# Patient Record
Sex: FEMALE | Race: WHITE | NOT HISPANIC OR LATINO | URBAN - METROPOLITAN AREA
[De-identification: names, ages, dates, MRNs, and addresses within clinical notes are randomized per-mention and may not be internally consistent; named-entity substitution may affect disease eponyms.]

---

## 2017-04-24 ENCOUNTER — EMERGENCY (EMERGENCY)
Facility: HOSPITAL | Age: 32
LOS: 0 days | Discharge: HOME | End: 2017-04-24
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
## 2017-06-27 DIAGNOSIS — R11.0 NAUSEA: ICD-10-CM
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
## 2017-06-27 DIAGNOSIS — J45.909 UNSPECIFIED ASTHMA, UNCOMPLICATED: ICD-10-CM

## 2017-06-27 DIAGNOSIS — R55 SYNCOPE AND COLLAPSE: ICD-10-CM
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
## 2019-01-16 PROBLEM — Z00.00 ENCOUNTER FOR PREVENTIVE HEALTH EXAMINATION: Status: ACTIVE | Noted: 2019-01-16

## 2019-02-01 ENCOUNTER — APPOINTMENT (OUTPATIENT)
Dept: OTOLARYNGOLOGY | Facility: CLINIC | Age: 34
End: 2019-02-01
Payer: COMMERCIAL

## 2019-02-01 VITALS
DIASTOLIC BLOOD PRESSURE: 84 MMHG | HEIGHT: 59 IN | SYSTOLIC BLOOD PRESSURE: 112 MMHG | WEIGHT: 97 LBS | BODY MASS INDEX: 19.56 KG/M2

## 2019-02-01 DIAGNOSIS — Z80.6 FAMILY HISTORY OF LEUKEMIA: ICD-10-CM

## 2019-02-01 DIAGNOSIS — Z78.9 OTHER SPECIFIED HEALTH STATUS: ICD-10-CM

## 2019-02-01 DIAGNOSIS — Z80.3 FAMILY HISTORY OF MALIGNANT NEOPLASM OF BREAST: ICD-10-CM

## 2019-02-01 DIAGNOSIS — J45.909 UNSPECIFIED ASTHMA, UNCOMPLICATED: ICD-10-CM

## 2019-02-01 PROCEDURE — 99204 OFFICE O/P NEW MOD 45 MIN: CPT | Mod: 25

## 2019-02-01 PROCEDURE — 31231 NASAL ENDOSCOPY DX: CPT

## 2019-02-01 NOTE — HISTORY OF PRESENT ILLNESS
[de-identified] : Patient here today c/o frequent sinus infections. Persistent pressure in her head leading to dizziness. Patient c/o sore throat and drip. Patient has history of rhinoplasty and sinus surgery 4 years ago. Patient uses Sudafed and Mucinex with no improvement. Patient feels frontal and maxillary pressure and headaches. She also gets double vision when this happens. \par She had amoxicillin recently for an infection.\par No anosmia. breathes well. \par She mentions 8 sinus infections in the past 12 months.\par

## 2019-04-17 ENCOUNTER — APPOINTMENT (OUTPATIENT)
Dept: OTOLARYNGOLOGY | Facility: CLINIC | Age: 34
End: 2019-04-17
Payer: COMMERCIAL

## 2019-04-17 DIAGNOSIS — J01.91 ACUTE RECURRENT SINUSITIS, UNSPECIFIED: ICD-10-CM

## 2019-04-17 DIAGNOSIS — R09.81 NASAL CONGESTION: ICD-10-CM

## 2019-04-17 PROCEDURE — 99214 OFFICE O/P EST MOD 30 MIN: CPT

## 2019-04-17 NOTE — HISTORY OF PRESENT ILLNESS
[de-identified] : Patient returns to the office to follow up on recurrent acute sinusitis. Pt completed a CT of the sinuses; Images were reviewed today in details.

## 2019-07-17 ENCOUNTER — OTHER (OUTPATIENT)
Age: 34
End: 2019-07-17

## 2019-07-17 RX ORDER — BUDESONIDE 0.25 MG/2ML
0.25 INHALANT ORAL
Qty: 2 | Refills: 0 | Status: ACTIVE | COMMUNITY
Start: 2019-04-17 | End: 1900-01-01

## 2021-03-17 ENCOUNTER — EMERGENCY (EMERGENCY)
Facility: HOSPITAL | Age: 36
LOS: 0 days | Discharge: HOME | End: 2021-03-18
Attending: EMERGENCY MEDICINE | Admitting: EMERGENCY MEDICINE
Payer: COMMERCIAL

## 2021-03-17 VITALS
DIASTOLIC BLOOD PRESSURE: 62 MMHG | OXYGEN SATURATION: 97 % | HEART RATE: 70 BPM | RESPIRATION RATE: 18 BRPM | SYSTOLIC BLOOD PRESSURE: 112 MMHG | TEMPERATURE: 98 F

## 2021-03-17 DIAGNOSIS — O21.9 VOMITING OF PREGNANCY, UNSPECIFIED: ICD-10-CM

## 2021-03-17 DIAGNOSIS — Z3A.08 8 WEEKS GESTATION OF PREGNANCY: ICD-10-CM

## 2021-03-17 DIAGNOSIS — O23.41 UNSPECIFIED INFECTION OF URINARY TRACT IN PREGNANCY, FIRST TRIMESTER: ICD-10-CM

## 2021-03-17 DIAGNOSIS — R11.10 VOMITING, UNSPECIFIED: ICD-10-CM

## 2021-03-17 LAB
ALBUMIN SERPL ELPH-MCNC: 4.5 G/DL — SIGNIFICANT CHANGE UP (ref 3.5–5.2)
ALP SERPL-CCNC: 48 U/L — SIGNIFICANT CHANGE UP (ref 30–115)
ALT FLD-CCNC: 26 U/L — SIGNIFICANT CHANGE UP (ref 0–41)
ANION GAP SERPL CALC-SCNC: 10 MMOL/L — SIGNIFICANT CHANGE UP (ref 7–14)
APPEARANCE UR: CLEAR — SIGNIFICANT CHANGE UP
AST SERPL-CCNC: 18 U/L — SIGNIFICANT CHANGE UP (ref 0–41)
BACTERIA # UR AUTO: ABNORMAL
BASOPHILS # BLD AUTO: 0.04 K/UL — SIGNIFICANT CHANGE UP (ref 0–0.2)
BASOPHILS NFR BLD AUTO: 0.4 % — SIGNIFICANT CHANGE UP (ref 0–1)
BILIRUB DIRECT SERPL-MCNC: <0.2 MG/DL — SIGNIFICANT CHANGE UP (ref 0–0.2)
BILIRUB INDIRECT FLD-MCNC: >0.1 MG/DL — LOW (ref 0.2–1.2)
BILIRUB SERPL-MCNC: 0.3 MG/DL — SIGNIFICANT CHANGE UP (ref 0.2–1.2)
BILIRUB UR-MCNC: NEGATIVE — SIGNIFICANT CHANGE UP
BLD GP AB SCN SERPL QL: SIGNIFICANT CHANGE UP
BUN SERPL-MCNC: 13 MG/DL — SIGNIFICANT CHANGE UP (ref 10–20)
CALCIUM SERPL-MCNC: 9.7 MG/DL — SIGNIFICANT CHANGE UP (ref 8.5–10.1)
CHLORIDE SERPL-SCNC: 97 MMOL/L — LOW (ref 98–110)
CO2 SERPL-SCNC: 24 MMOL/L — SIGNIFICANT CHANGE UP (ref 17–32)
COLOR SPEC: YELLOW — SIGNIFICANT CHANGE UP
CREAT SERPL-MCNC: 0.6 MG/DL — LOW (ref 0.7–1.5)
DIFF PNL FLD: ABNORMAL
EOSINOPHIL # BLD AUTO: 0.03 K/UL — SIGNIFICANT CHANGE UP (ref 0–0.7)
EOSINOPHIL NFR BLD AUTO: 0.3 % — SIGNIFICANT CHANGE UP (ref 0–8)
EPI CELLS # UR: 5 /HPF — SIGNIFICANT CHANGE UP (ref 0–5)
GLUCOSE SERPL-MCNC: 76 MG/DL — SIGNIFICANT CHANGE UP (ref 70–99)
GLUCOSE UR QL: NEGATIVE — SIGNIFICANT CHANGE UP
HCT VFR BLD CALC: 40.2 % — SIGNIFICANT CHANGE UP (ref 37–47)
HGB BLD-MCNC: 13.6 G/DL — SIGNIFICANT CHANGE UP (ref 12–16)
HYALINE CASTS # UR AUTO: 14 /LPF — HIGH (ref 0–7)
IMM GRANULOCYTES NFR BLD AUTO: 0.3 % — SIGNIFICANT CHANGE UP (ref 0.1–0.3)
KETONES UR-MCNC: ABNORMAL
LACTATE SERPL-SCNC: 0.9 MMOL/L — SIGNIFICANT CHANGE UP (ref 0.7–2)
LEUKOCYTE ESTERASE UR-ACNC: ABNORMAL
LIDOCAIN IGE QN: 27 U/L — SIGNIFICANT CHANGE UP (ref 7–60)
LYMPHOCYTES # BLD AUTO: 1.67 K/UL — SIGNIFICANT CHANGE UP (ref 1.2–3.4)
LYMPHOCYTES # BLD AUTO: 17.1 % — LOW (ref 20.5–51.1)
MCHC RBC-ENTMCNC: 31.1 PG — HIGH (ref 27–31)
MCHC RBC-ENTMCNC: 33.8 G/DL — SIGNIFICANT CHANGE UP (ref 32–37)
MCV RBC AUTO: 92 FL — SIGNIFICANT CHANGE UP (ref 81–99)
MONOCYTES # BLD AUTO: 0.61 K/UL — HIGH (ref 0.1–0.6)
MONOCYTES NFR BLD AUTO: 6.2 % — SIGNIFICANT CHANGE UP (ref 1.7–9.3)
NEUTROPHILS # BLD AUTO: 7.41 K/UL — HIGH (ref 1.4–6.5)
NEUTROPHILS NFR BLD AUTO: 75.7 % — HIGH (ref 42.2–75.2)
NITRITE UR-MCNC: NEGATIVE — SIGNIFICANT CHANGE UP
NRBC # BLD: 0 /100 WBCS — SIGNIFICANT CHANGE UP (ref 0–0)
PH UR: 6 — SIGNIFICANT CHANGE UP (ref 5–8)
PLATELET # BLD AUTO: 204 K/UL — SIGNIFICANT CHANGE UP (ref 130–400)
POTASSIUM SERPL-MCNC: 3.8 MMOL/L — SIGNIFICANT CHANGE UP (ref 3.5–5)
POTASSIUM SERPL-SCNC: 3.8 MMOL/L — SIGNIFICANT CHANGE UP (ref 3.5–5)
PROT SERPL-MCNC: 7.2 G/DL — SIGNIFICANT CHANGE UP (ref 6–8)
PROT UR-MCNC: ABNORMAL
RBC # BLD: 4.37 M/UL — SIGNIFICANT CHANGE UP (ref 4.2–5.4)
RBC # FLD: 13.1 % — SIGNIFICANT CHANGE UP (ref 11.5–14.5)
RBC CASTS # UR COMP ASSIST: 3 /HPF — SIGNIFICANT CHANGE UP (ref 0–4)
SODIUM SERPL-SCNC: 131 MMOL/L — LOW (ref 135–146)
SP GR SPEC: 1.04 — HIGH (ref 1.01–1.03)
UROBILINOGEN FLD QL: SIGNIFICANT CHANGE UP
WBC # BLD: 9.79 K/UL — SIGNIFICANT CHANGE UP (ref 4.8–10.8)
WBC # FLD AUTO: 9.79 K/UL — SIGNIFICANT CHANGE UP (ref 4.8–10.8)
WBC UR QL: 23 /HPF — HIGH (ref 0–5)

## 2021-03-17 PROCEDURE — 99285 EMERGENCY DEPT VISIT HI MDM: CPT

## 2021-03-17 RX ORDER — SODIUM CHLORIDE 9 MG/ML
2000 INJECTION INTRAMUSCULAR; INTRAVENOUS; SUBCUTANEOUS ONCE
Refills: 0 | Status: COMPLETED | OUTPATIENT
Start: 2021-03-17 | End: 2021-03-17

## 2021-03-17 RX ORDER — METOCLOPRAMIDE HCL 10 MG
10 TABLET ORAL ONCE
Refills: 0 | Status: COMPLETED | OUTPATIENT
Start: 2021-03-17 | End: 2021-03-17

## 2021-03-17 RX ADMIN — Medication 10 MILLIGRAM(S): at 22:29

## 2021-03-17 RX ADMIN — SODIUM CHLORIDE 2000 MILLILITER(S): 9 INJECTION INTRAMUSCULAR; INTRAVENOUS; SUBCUTANEOUS at 21:50

## 2021-03-17 NOTE — ED PROVIDER NOTE - ATTENDING CONTRIBUTION TO CARE
35yr old female  8 weeks pregnant here for eval of vomitting. pt reports mult episodes of vomiting estimates 20X a day for the past few days. pt also endorses mild lower abdominal pain, primarily on left. With this patient feels dehyydrated, headache. when she went to the bathroom she had small tinge of blood when she wiped. on exam pt no distress, gravid abdomen, soft non tender, moving all ext.

## 2021-03-17 NOTE — ED PROVIDER NOTE - CLINICAL SUMMARY MEDICAL DECISION MAKING FREE TEXT BOX
Pt presented with nausea and vomiting. Required labs, meds and imaging. Pt presented with nausea and vomiting, + 8 wks gestation. Required labs, meds and imaging. Was found have have IUP with normal FHR. Pt tolerated juice. Will be d/luis eduardo with outpt f/up.

## 2021-03-17 NOTE — ED PROVIDER NOTE - CARE PROVIDER_API CALL
Bairon Sams)  Obstetrics and Gynecology  [address] [place] [address]  Phone: [phone]  Fax: [phone]  Follow Up Time: 1-3 Days

## 2021-03-17 NOTE — ED PROVIDER NOTE - OBJECTIVE STATEMENT
34 y/o F , 1 miscarriage, currently 8wks pregnant by US presents with multiple episodes of nausea and non-bloody non-bilious vomiting x 4 days-- spoke to her OB Dr. Sams's office who recommended eval in ED. +mild LLQ abdominal pain-- constant cramping, non-radiating. no palliating/provoking factors. +decreased frequency of urination x 2 days.   no vaginal bleeding, but relates that here she wiped prior to providing urine sample and there was brown on the toilet paper-- but no blood in underwear/in toilet.   no cp/sob/covid contacts/fever/rash/cough/congestion.

## 2021-03-17 NOTE — ED PROVIDER NOTE - PROGRESS NOTE DETAILS
ALESHA: pt just brought into ED from triage s/o to Dr. Martinez f/u korin and re-eval Ndubuisi: Sign out received from Dr. Crump  Pt evaluated by me. Presented with nausea and vomiting. Has slight left pelvic cramps. "Wiped brown" after using the bathroom here in the ED. Labs wnl, pending hcg, US with live intrauterine gestation. Will po challenge pt and dispo accordingly. PALEVY: rpt abdominal exam remains nontender. tolerating po. Reviewed all results and necessity for follow up. Counseled on red flags and to return for them.  Patient appears well on discharge.

## 2021-03-17 NOTE — ED PROVIDER NOTE - PHYSICAL EXAMINATION
PHYSICAL EXAM:    GENERAL: Alert, appears stated age, well appearing, non-toxic  SKIN: Warm, pink and dry.  HEAD: NC  EYE: Normal lids/conjunctiva  ENT: Normal hearing, patent oropharynx  NECK: +supple. No meningismus, or JVD  Pulm: Bilateral BS, normal resp effort, no wheezes, stridor, or retractions  CV: RRR, no M/R/G, 2+and = radial pulses  Abd: soft, non-tender, non-distended, no rebound/guarding. no RUQ/RLQ/LLQ/LUQ TTP. no CVA tenderness.   Mskel: no erythema, cyanosis, edema. no calf tenderness  Neuro: AAOx3. normal gait.

## 2021-03-17 NOTE — ED PROVIDER NOTE - PATIENT PORTAL LINK FT
You can access the FollowMyHealth Patient Portal offered by [facility] [place] by registering at the following website: [email or web address]. By joining [employer]’s FollowMyHealth portal, you will also be able to view your health information using other applications (apps) compatible with our system.

## 2021-03-17 NOTE — ED PROVIDER NOTE - CARE PLAN
Principal Discharge DX:	Nausea & vomiting  Secondary Diagnosis:	Pregnancy   Principal Discharge DX:	Nausea & vomiting  Secondary Diagnosis:	Pregnancy  Secondary Diagnosis:	UTI (urinary tract infection)

## 2021-03-17 NOTE — ED PROVIDER NOTE - NS ED ROS FT
Review of Systems    Constitutional: (-) fever   Eyes/ENT: (-) vision changes  Cardiovascular: (-) chest pain, (-) syncope (-) palpitations  Respiratory: (-) cough, (-) shortness of breath  Gastrointestinal: (+) vomiting, (-) diarrhea (-)black/bloody stools (+) abdominal pain  Genitourinary:  (-) dysuria   Musculoskeletal: (-) neck pain, (-) back pain, (-) leg pain/swelling  Integumentary: (-) rash, (-) edema  Neurological: (-) confusion  Hematologic: (-) easy bruising   Psychiatric: (-) hallucinations  Allergic/Immunologic: (-) pruritus

## 2021-03-18 LAB — HCG SERPL-ACNC: HIGH MIU/ML

## 2021-03-18 PROCEDURE — 76856 US EXAM PELVIC COMPLETE: CPT | Mod: 26

## 2021-03-18 RX ORDER — NITROFURANTOIN MACROCRYSTAL 50 MG
1 CAPSULE ORAL
Qty: 10 | Refills: 0
Start: 2021-03-18 | End: 2021-03-22

## 2021-03-18 RX ORDER — NITROFURANTOIN MACROCRYSTAL 50 MG
100 CAPSULE ORAL ONCE
Refills: 0 | Status: COMPLETED | OUTPATIENT
Start: 2021-03-18 | End: 2021-03-18

## 2021-03-18 RX ADMIN — Medication 100 MILLIGRAM(S): at 02:11

## 2021-03-19 LAB
CULTURE RESULTS: SIGNIFICANT CHANGE UP
SPECIMEN SOURCE: SIGNIFICANT CHANGE UP

## 2021-03-23 ENCOUNTER — EMERGENCY (EMERGENCY)
Facility: HOSPITAL | Age: 36
LOS: 0 days | Discharge: HOME | End: 2021-03-23
Attending: EMERGENCY MEDICINE | Admitting: EMERGENCY MEDICINE
Payer: COMMERCIAL

## 2021-03-23 VITALS
HEIGHT: 59 IN | SYSTOLIC BLOOD PRESSURE: 121 MMHG | TEMPERATURE: 99 F | RESPIRATION RATE: 18 BRPM | OXYGEN SATURATION: 97 % | HEART RATE: 75 BPM | WEIGHT: 93.04 LBS | DIASTOLIC BLOOD PRESSURE: 66 MMHG

## 2021-03-23 VITALS
SYSTOLIC BLOOD PRESSURE: 102 MMHG | DIASTOLIC BLOOD PRESSURE: 52 MMHG | TEMPERATURE: 98 F | RESPIRATION RATE: 18 BRPM | HEART RATE: 72 BPM | OXYGEN SATURATION: 100 %

## 2021-03-23 DIAGNOSIS — O21.9 VOMITING OF PREGNANCY, UNSPECIFIED: ICD-10-CM

## 2021-03-23 DIAGNOSIS — Z3A.09 9 WEEKS GESTATION OF PREGNANCY: ICD-10-CM

## 2021-03-23 DIAGNOSIS — J45.909 UNSPECIFIED ASTHMA, UNCOMPLICATED: ICD-10-CM

## 2021-03-23 DIAGNOSIS — R11.10 VOMITING, UNSPECIFIED: ICD-10-CM

## 2021-03-23 LAB
ALBUMIN SERPL ELPH-MCNC: 4.1 G/DL — SIGNIFICANT CHANGE UP (ref 3.5–5.2)
ALP SERPL-CCNC: 43 U/L — SIGNIFICANT CHANGE UP (ref 30–115)
ALT FLD-CCNC: 17 U/L — SIGNIFICANT CHANGE UP (ref 0–41)
ANION GAP SERPL CALC-SCNC: 9 MMOL/L — SIGNIFICANT CHANGE UP (ref 7–14)
APPEARANCE UR: CLEAR — SIGNIFICANT CHANGE UP
AST SERPL-CCNC: 13 U/L — SIGNIFICANT CHANGE UP (ref 0–41)
BASOPHILS # BLD AUTO: 0.02 K/UL — SIGNIFICANT CHANGE UP (ref 0–0.2)
BASOPHILS NFR BLD AUTO: 0.2 % — SIGNIFICANT CHANGE UP (ref 0–1)
BILIRUB SERPL-MCNC: 0.2 MG/DL — SIGNIFICANT CHANGE UP (ref 0.2–1.2)
BILIRUB UR-MCNC: NEGATIVE — SIGNIFICANT CHANGE UP
BUN SERPL-MCNC: 10 MG/DL — SIGNIFICANT CHANGE UP (ref 10–20)
CALCIUM SERPL-MCNC: 9.3 MG/DL — SIGNIFICANT CHANGE UP (ref 8.5–10.1)
CHLORIDE SERPL-SCNC: 100 MMOL/L — SIGNIFICANT CHANGE UP (ref 98–110)
CO2 SERPL-SCNC: 25 MMOL/L — SIGNIFICANT CHANGE UP (ref 17–32)
COLOR SPEC: YELLOW — SIGNIFICANT CHANGE UP
CREAT SERPL-MCNC: 0.5 MG/DL — LOW (ref 0.7–1.5)
DIFF PNL FLD: NEGATIVE — SIGNIFICANT CHANGE UP
EOSINOPHIL # BLD AUTO: 0.03 K/UL — SIGNIFICANT CHANGE UP (ref 0–0.7)
EOSINOPHIL NFR BLD AUTO: 0.3 % — SIGNIFICANT CHANGE UP (ref 0–8)
GLUCOSE SERPL-MCNC: 96 MG/DL — SIGNIFICANT CHANGE UP (ref 70–99)
GLUCOSE UR QL: NEGATIVE MG/DL — SIGNIFICANT CHANGE UP
HCT VFR BLD CALC: 38.9 % — SIGNIFICANT CHANGE UP (ref 37–47)
HGB BLD-MCNC: 13.4 G/DL — SIGNIFICANT CHANGE UP (ref 12–16)
IMM GRANULOCYTES NFR BLD AUTO: 0.3 % — SIGNIFICANT CHANGE UP (ref 0.1–0.3)
KETONES UR-MCNC: NEGATIVE — SIGNIFICANT CHANGE UP
LEUKOCYTE ESTERASE UR-ACNC: NEGATIVE — SIGNIFICANT CHANGE UP
LYMPHOCYTES # BLD AUTO: 1.19 K/UL — LOW (ref 1.2–3.4)
LYMPHOCYTES # BLD AUTO: 13.7 % — LOW (ref 20.5–51.1)
MAGNESIUM SERPL-MCNC: 1.9 MG/DL — SIGNIFICANT CHANGE UP (ref 1.8–2.4)
MCHC RBC-ENTMCNC: 31.9 PG — HIGH (ref 27–31)
MCHC RBC-ENTMCNC: 34.4 G/DL — SIGNIFICANT CHANGE UP (ref 32–37)
MCV RBC AUTO: 92.6 FL — SIGNIFICANT CHANGE UP (ref 81–99)
MONOCYTES # BLD AUTO: 0.51 K/UL — SIGNIFICANT CHANGE UP (ref 0.1–0.6)
MONOCYTES NFR BLD AUTO: 5.9 % — SIGNIFICANT CHANGE UP (ref 1.7–9.3)
NEUTROPHILS # BLD AUTO: 6.92 K/UL — HIGH (ref 1.4–6.5)
NEUTROPHILS NFR BLD AUTO: 79.6 % — HIGH (ref 42.2–75.2)
NITRITE UR-MCNC: NEGATIVE — SIGNIFICANT CHANGE UP
NRBC # BLD: 0 /100 WBCS — SIGNIFICANT CHANGE UP (ref 0–0)
PH UR: 6 — SIGNIFICANT CHANGE UP (ref 5–8)
PLATELET # BLD AUTO: 213 K/UL — SIGNIFICANT CHANGE UP (ref 130–400)
POTASSIUM SERPL-MCNC: 3.9 MMOL/L — SIGNIFICANT CHANGE UP (ref 3.5–5)
POTASSIUM SERPL-SCNC: 3.9 MMOL/L — SIGNIFICANT CHANGE UP (ref 3.5–5)
PROT SERPL-MCNC: 6.4 G/DL — SIGNIFICANT CHANGE UP (ref 6–8)
PROT UR-MCNC: NEGATIVE MG/DL — SIGNIFICANT CHANGE UP
RBC # BLD: 4.2 M/UL — SIGNIFICANT CHANGE UP (ref 4.2–5.4)
RBC # FLD: 13 % — SIGNIFICANT CHANGE UP (ref 11.5–14.5)
SODIUM SERPL-SCNC: 134 MMOL/L — LOW (ref 135–146)
SP GR SPEC: >=1.03 (ref 1.01–1.03)
UROBILINOGEN FLD QL: 0.2 MG/DL — SIGNIFICANT CHANGE UP (ref 0.2–0.2)
WBC # BLD: 8.7 K/UL — SIGNIFICANT CHANGE UP (ref 4.8–10.8)
WBC # FLD AUTO: 8.7 K/UL — SIGNIFICANT CHANGE UP (ref 4.8–10.8)

## 2021-03-23 PROCEDURE — 99284 EMERGENCY DEPT VISIT MOD MDM: CPT

## 2021-03-23 RX ORDER — SODIUM CHLORIDE 9 MG/ML
1000 INJECTION, SOLUTION INTRAVENOUS
Refills: 0 | Status: DISCONTINUED | OUTPATIENT
Start: 2021-03-23 | End: 2021-03-23

## 2021-03-23 RX ORDER — SODIUM CHLORIDE 9 MG/ML
1000 INJECTION, SOLUTION INTRAVENOUS ONCE
Refills: 0 | Status: COMPLETED | OUTPATIENT
Start: 2021-03-23 | End: 2021-03-23

## 2021-03-23 RX ORDER — ONDANSETRON 8 MG/1
1 TABLET, FILM COATED ORAL
Qty: 9 | Refills: 0
Start: 2021-03-23 | End: 2021-03-25

## 2021-03-23 RX ORDER — ONDANSETRON 8 MG/1
4 TABLET, FILM COATED ORAL ONCE
Refills: 0 | Status: COMPLETED | OUTPATIENT
Start: 2021-03-23 | End: 2021-03-23

## 2021-03-23 RX ORDER — METOCLOPRAMIDE HCL 10 MG
10 TABLET ORAL ONCE
Refills: 0 | Status: COMPLETED | OUTPATIENT
Start: 2021-03-23 | End: 2021-03-23

## 2021-03-23 RX ADMIN — SODIUM CHLORIDE 1000 MILLILITER(S): 9 INJECTION, SOLUTION INTRAVENOUS at 14:13

## 2021-03-23 RX ADMIN — ONDANSETRON 4 MILLIGRAM(S): 8 TABLET, FILM COATED ORAL at 14:12

## 2021-03-23 RX ADMIN — SODIUM CHLORIDE 1000 MILLILITER(S): 9 INJECTION, SOLUTION INTRAVENOUS at 14:48

## 2021-03-23 RX ADMIN — Medication 10 MILLIGRAM(S): at 15:36

## 2021-03-23 NOTE — ED ADULT NURSE NOTE - NSIMPLEMENTINTERV_GEN_ALL_ED
Implemented All Fall with Harm Risk Interventions:  [place] to call system. Call bell, personal items and telephone within reach. Instruct patient to call for assistance. Room bathroom lighting operational. Non-slip footwear when patient is off stretcher. Physically safe environment: no spills, clutter or unnecessary equipment. Stretcher in lowest position, wheels locked, appropriate side rails in place. Provide visual cue, wrist band, yellow gown, etc. Monitor gait and stability. Monitor for mental status changes and reorient to person, place, and time. Review medications for side effects contributing to fall risk. Reinforce activity limits and safety measures with patient and family. Provide visual clues: red socks.

## 2021-03-23 NOTE — ED PROVIDER NOTE - PHYSICAL EXAMINATION
Physical Exam    Vital Signs: I have reviewed the initial vital signs.  Constitutional: well-nourished, appears stated age, no acute distress  Eyes: Conjunctiva pink, Sclera clear,  Cardiovascular: S1 and S2, regular rate, regular rhythm, well-perfused extremities, radial pulses equal and 2+  Respiratory: unlabored respiratory effort, clear to auscultation bilaterally no wheezing, rales and rhonchi  Gastrointestinal: soft, non-tender abdomen, no pulsatile mass, normal bowl sounds  Musculoskeletal: supple neck, no lower extremity edema, no midline tenderness  Integumentary: warm, dry, no rash  Neurologic: awake, alert, nvi

## 2021-03-23 NOTE — ED PROVIDER NOTE - CLINICAL SUMMARY MEDICAL DECISION MAKING FREE TEXT BOX
36yo F,  (one miscarriage), approx 9 weeks pregnant, presents to ED for nausea and vomiting. Labs reassuring. Vitals stable. Tolerating PO. F/u ObGyn. Return precautions given.

## 2021-03-23 NOTE — ED PROVIDER NOTE - OBJECTIVE STATEMENT
36 yo female,  hx one miscarriage, currently 12 weeks pregnant adilia Sams, presents to ed for nausea and vomiting, few days, recent ed visit for same, nbnb vomiting. no pain or radiation. denies fever, dysuria, hematuria, vaginal bleeding, cp, sob, abd pain. 36 yo female,  hx one miscarriage, currently about 9 weeks pregnant adilia Sams, presents to ed for nausea and vomiting, few days, recent ed visit for same, nbnb vomiting. no pain or radiation. denies fever, dysuria, hematuria, vaginal bleeding, cp, sob, abd pain.

## 2021-03-23 NOTE — ED PROVIDER NOTE - PATIENT PORTAL LINK FT
You can access the FollowMyHealth Patient Portal offered by [facility] by registering at the following website: [email or web address]. By joining [employer]’s FollowMyHealth portal, you will also be able to view your health information using other applications (apps) compatible with our system.

## 2021-03-23 NOTE — ED PROVIDER NOTE - ATTENDING CONTRIBUTION TO CARE
34yo F,  (one miscarriage), approx 9 weeks pregnant, Obgyn Dr. Sams, presents to ED for nausea and vomiting, poor PO tolerance. Patient was here 6 days ago for similar, states has been having hyperemesis throughout this pregnancy, has not had it with prior pregnancies. States using Vit B6, zofran, reglan all without significant relief. Denies fever, chills, headache, lightheadedness, CP, SOB, cough, diarrhea, abd pain, dysuria, vaginal bleeding or discharge.     Vital signs reviewed  GENERAL: Patient nontoxic appearing, NAD  HEAD: NCAT  EYES: Anicteric  ENT: MMM  RESPIRATORY: Normal respiratory effort. CTA B/L. No wheezing, rales, rhonchi  CARDIOVASCULAR: Regular rate and rhythm  ABDOMEN: Soft. Nondistended. Nontender  MUSCULOSKELETAL/EXTREMITIES: Brisk cap refill  SKIN:  Warm and dry  NEURO: AAOx3. No gross FND.

## 2021-03-23 NOTE — ED PROVIDER NOTE - CARE PROVIDER_API CALL
Bairon Sams)  Obstetrics and Gynecology  [address] [place] [address]  Phone: [phone]  Fax: [phone]  Follow Up Time: 1-3 Days

## 2021-03-23 NOTE — ED PROVIDER NOTE - CHIEF COMPLAINT
The patient is a 35y Female complaining of vomiting. Drysol Counseling:  I discussed with the patient the risks of drysol/aluminum chloride including but not limited to skin rash, itching, irritation, burning.

## 2021-03-29 PROBLEM — J45.20 MILD INTERMITTENT ASTHMA, UNCOMPLICATED: Chronic | Status: ACTIVE | Noted: 2021-03-23

## 2021-04-13 ENCOUNTER — APPOINTMENT (OUTPATIENT)
Dept: MATERNAL FETAL MEDICINE | Facility: CLINIC | Age: 36
End: 2021-04-13

## 2021-04-13 ENCOUNTER — APPOINTMENT (OUTPATIENT)
Dept: ANTEPARTUM | Facility: CLINIC | Age: 36
End: 2021-04-13
Payer: COMMERCIAL

## 2021-04-13 VITALS
SYSTOLIC BLOOD PRESSURE: 108 MMHG | WEIGHT: 98 LBS | BODY MASS INDEX: 21.14 KG/M2 | HEIGHT: 57 IN | DIASTOLIC BLOOD PRESSURE: 72 MMHG | TEMPERATURE: 98.5 F

## 2021-04-13 DIAGNOSIS — Z34.90 ENCOUNTER FOR SUPERVISION OF NORMAL PREGNANCY, UNSPECIFIED, UNSPECIFIED TRIMESTER: ICD-10-CM

## 2021-04-13 PROCEDURE — 76801 OB US < 14 WKS SINGLE FETUS: CPT

## 2021-04-13 PROCEDURE — 76813 OB US NUCHAL MEAS 1 GEST: CPT

## 2021-04-13 PROCEDURE — 99072 ADDL SUPL MATRL&STAF TM PHE: CPT

## 2021-04-18 ENCOUNTER — TRANSCRIPTION ENCOUNTER (OUTPATIENT)
Age: 36
End: 2021-04-18

## 2021-04-20 ENCOUNTER — TRANSCRIPTION ENCOUNTER (OUTPATIENT)
Age: 36
End: 2021-04-20

## 2021-06-15 ENCOUNTER — NON-APPOINTMENT (OUTPATIENT)
Age: 36
End: 2021-06-15

## 2021-06-16 ENCOUNTER — APPOINTMENT (OUTPATIENT)
Dept: ANTEPARTUM | Facility: CLINIC | Age: 36
End: 2021-06-16
Payer: COMMERCIAL

## 2021-06-16 VITALS
TEMPERATURE: 98 F | DIASTOLIC BLOOD PRESSURE: 64 MMHG | BODY MASS INDEX: 23.3 KG/M2 | HEIGHT: 58 IN | HEART RATE: 68 BPM | WEIGHT: 111 LBS | SYSTOLIC BLOOD PRESSURE: 100 MMHG

## 2021-06-16 PROCEDURE — 76811 OB US DETAILED SNGL FETUS: CPT

## 2021-06-16 PROCEDURE — 99072 ADDL SUPL MATRL&STAF TM PHE: CPT

## 2021-06-16 PROCEDURE — 76819 FETAL BIOPHYS PROFIL W/O NST: CPT

## 2021-08-25 ENCOUNTER — APPOINTMENT (OUTPATIENT)
Dept: ANTEPARTUM | Facility: CLINIC | Age: 36
End: 2021-08-25
Payer: COMMERCIAL

## 2021-08-25 VITALS
DIASTOLIC BLOOD PRESSURE: 68 MMHG | HEIGHT: 58 IN | BODY MASS INDEX: 25.82 KG/M2 | WEIGHT: 123 LBS | TEMPERATURE: 98 F | SYSTOLIC BLOOD PRESSURE: 110 MMHG

## 2021-08-25 PROCEDURE — 76816 OB US FOLLOW-UP PER FETUS: CPT

## 2021-08-25 PROCEDURE — 76819 FETAL BIOPHYS PROFIL W/O NST: CPT

## 2021-10-06 ENCOUNTER — ASOB RESULT (OUTPATIENT)
Age: 36
End: 2021-10-06

## 2021-10-06 ENCOUNTER — APPOINTMENT (OUTPATIENT)
Dept: ANTEPARTUM | Facility: CLINIC | Age: 36
End: 2021-10-06
Payer: COMMERCIAL

## 2021-10-06 VITALS
HEIGHT: 58 IN | DIASTOLIC BLOOD PRESSURE: 72 MMHG | WEIGHT: 129 LBS | BODY MASS INDEX: 27.08 KG/M2 | SYSTOLIC BLOOD PRESSURE: 114 MMHG

## 2021-10-06 PROCEDURE — 76816 OB US FOLLOW-UP PER FETUS: CPT | Mod: 26

## 2021-10-06 PROCEDURE — 76819 FETAL BIOPHYS PROFIL W/O NST: CPT | Mod: 26

## 2021-10-12 ENCOUNTER — INPATIENT (INPATIENT)
Facility: HOSPITAL | Age: 36
LOS: 0 days | Discharge: HOME | End: 2021-10-13
Attending: OBSTETRICS & GYNECOLOGY | Admitting: OBSTETRICS & GYNECOLOGY

## 2021-10-12 VITALS
SYSTOLIC BLOOD PRESSURE: 116 MMHG | DIASTOLIC BLOOD PRESSURE: 74 MMHG | RESPIRATION RATE: 18 BRPM | TEMPERATURE: 98 F | HEART RATE: 113 BPM

## 2021-10-12 DIAGNOSIS — Z98.890 OTHER SPECIFIED POSTPROCEDURAL STATES: Chronic | ICD-10-CM

## 2021-10-12 DIAGNOSIS — Z98.82 BREAST IMPLANT STATUS: Chronic | ICD-10-CM

## 2021-10-12 LAB
AMPHET UR-MCNC: NEGATIVE — SIGNIFICANT CHANGE UP
APPEARANCE UR: CLEAR — SIGNIFICANT CHANGE UP
BARBITURATES UR SCN-MCNC: NEGATIVE — SIGNIFICANT CHANGE UP
BASOPHILS # BLD AUTO: 0.02 K/UL — SIGNIFICANT CHANGE UP (ref 0–0.2)
BASOPHILS NFR BLD AUTO: 0.2 % — SIGNIFICANT CHANGE UP (ref 0–1)
BENZODIAZ UR-MCNC: NEGATIVE — SIGNIFICANT CHANGE UP
BILIRUB UR-MCNC: NEGATIVE — SIGNIFICANT CHANGE UP
BLD GP AB SCN SERPL QL: SIGNIFICANT CHANGE UP
BUPRENORPHINE SCREEN, URINE RESULT: NEGATIVE — SIGNIFICANT CHANGE UP
COCAINE METAB.OTHER UR-MCNC: NEGATIVE — SIGNIFICANT CHANGE UP
COLOR SPEC: YELLOW — SIGNIFICANT CHANGE UP
DIFF PNL FLD: SIGNIFICANT CHANGE UP
EOSINOPHIL # BLD AUTO: 0.03 K/UL — SIGNIFICANT CHANGE UP (ref 0–0.7)
EOSINOPHIL NFR BLD AUTO: 0.3 % — SIGNIFICANT CHANGE UP (ref 0–8)
FENTANYL UR QL: NEGATIVE — SIGNIFICANT CHANGE UP
GLUCOSE UR QL: NEGATIVE — SIGNIFICANT CHANGE UP
HCT VFR BLD CALC: 38.6 % — SIGNIFICANT CHANGE UP (ref 37–47)
HGB BLD-MCNC: 13 G/DL — SIGNIFICANT CHANGE UP (ref 12–16)
IMM GRANULOCYTES NFR BLD AUTO: 0.3 % — SIGNIFICANT CHANGE UP (ref 0.1–0.3)
KETONES UR-MCNC: NEGATIVE — SIGNIFICANT CHANGE UP
L&D DRUG SCREEN, URINE: SIGNIFICANT CHANGE UP
LEUKOCYTE ESTERASE UR-ACNC: NEGATIVE — SIGNIFICANT CHANGE UP
LYMPHOCYTES # BLD AUTO: 1.21 K/UL — SIGNIFICANT CHANGE UP (ref 1.2–3.4)
LYMPHOCYTES # BLD AUTO: 12.3 % — LOW (ref 20.5–51.1)
MCHC RBC-ENTMCNC: 31 PG — SIGNIFICANT CHANGE UP (ref 27–31)
MCHC RBC-ENTMCNC: 33.7 G/DL — SIGNIFICANT CHANGE UP (ref 32–37)
MCV RBC AUTO: 92.1 FL — SIGNIFICANT CHANGE UP (ref 81–99)
METHADONE UR-MCNC: NEGATIVE — SIGNIFICANT CHANGE UP
MONOCYTES # BLD AUTO: 0.89 K/UL — HIGH (ref 0.1–0.6)
MONOCYTES NFR BLD AUTO: 9.1 % — SIGNIFICANT CHANGE UP (ref 1.7–9.3)
NEUTROPHILS # BLD AUTO: 7.65 K/UL — HIGH (ref 1.4–6.5)
NEUTROPHILS NFR BLD AUTO: 77.8 % — HIGH (ref 42.2–75.2)
NITRITE UR-MCNC: NEGATIVE — SIGNIFICANT CHANGE UP
NRBC # BLD: 0 /100 WBCS — SIGNIFICANT CHANGE UP (ref 0–0)
OPIATES UR-MCNC: NEGATIVE — SIGNIFICANT CHANGE UP
OXYCODONE UR-MCNC: NEGATIVE — SIGNIFICANT CHANGE UP
PCP UR-MCNC: NEGATIVE — SIGNIFICANT CHANGE UP
PH UR: 6 — SIGNIFICANT CHANGE UP (ref 5–8)
PLATELET # BLD AUTO: 151 K/UL — SIGNIFICANT CHANGE UP (ref 130–400)
PRENATAL SYPHILIS TEST: SIGNIFICANT CHANGE UP
PROPOXYPHENE QUALITATIVE URINE RESULT: NEGATIVE — SIGNIFICANT CHANGE UP
PROT UR-MCNC: SIGNIFICANT CHANGE UP
RBC # BLD: 4.19 M/UL — LOW (ref 4.2–5.4)
RBC # FLD: 13.6 % — SIGNIFICANT CHANGE UP (ref 11.5–14.5)
SARS-COV-2 RNA SPEC QL NAA+PROBE: SIGNIFICANT CHANGE UP
SP GR SPEC: 1.03 — SIGNIFICANT CHANGE UP (ref 1.01–1.03)
UROBILINOGEN FLD QL: SIGNIFICANT CHANGE UP
WBC # BLD: 9.83 K/UL — SIGNIFICANT CHANGE UP (ref 4.8–10.8)
WBC # FLD AUTO: 9.83 K/UL — SIGNIFICANT CHANGE UP (ref 4.8–10.8)

## 2021-10-12 RX ORDER — DIBUCAINE 1 %
1 OINTMENT (GRAM) RECTAL EVERY 6 HOURS
Refills: 0 | Status: DISCONTINUED | OUTPATIENT
Start: 2021-10-12 | End: 2021-10-13

## 2021-10-12 RX ORDER — BENZOCAINE 10 %
1 GEL (GRAM) MUCOUS MEMBRANE EVERY 6 HOURS
Refills: 0 | Status: DISCONTINUED | OUTPATIENT
Start: 2021-10-12 | End: 2021-10-13

## 2021-10-12 RX ORDER — HYDROCORTISONE 1 %
1 OINTMENT (GRAM) TOPICAL EVERY 6 HOURS
Refills: 0 | Status: DISCONTINUED | OUTPATIENT
Start: 2021-10-12 | End: 2021-10-13

## 2021-10-12 RX ORDER — OXYTOCIN 10 UNIT/ML
333.33 VIAL (ML) INJECTION
Qty: 20 | Refills: 0 | Status: DISCONTINUED | OUTPATIENT
Start: 2021-10-12 | End: 2021-10-13

## 2021-10-12 RX ORDER — DIPHENHYDRAMINE HCL 50 MG
25 CAPSULE ORAL EVERY 6 HOURS
Refills: 0 | Status: DISCONTINUED | OUTPATIENT
Start: 2021-10-12 | End: 2021-10-13

## 2021-10-12 RX ORDER — SODIUM CHLORIDE 9 MG/ML
3 INJECTION INTRAMUSCULAR; INTRAVENOUS; SUBCUTANEOUS EVERY 8 HOURS
Refills: 0 | Status: DISCONTINUED | OUTPATIENT
Start: 2021-10-12 | End: 2021-10-13

## 2021-10-12 RX ORDER — OXYCODONE HYDROCHLORIDE 5 MG/1
5 TABLET ORAL ONCE
Refills: 0 | Status: DISCONTINUED | OUTPATIENT
Start: 2021-10-12 | End: 2021-10-13

## 2021-10-12 RX ORDER — IBUPROFEN 200 MG
600 TABLET ORAL EVERY 6 HOURS
Refills: 0 | Status: DISCONTINUED | OUTPATIENT
Start: 2021-10-12 | End: 2021-10-13

## 2021-10-12 RX ORDER — ACETAMINOPHEN 500 MG
975 TABLET ORAL
Refills: 0 | Status: DISCONTINUED | OUTPATIENT
Start: 2021-10-12 | End: 2021-10-13

## 2021-10-12 RX ORDER — AMPICILLIN TRIHYDRATE 250 MG
1 CAPSULE ORAL EVERY 4 HOURS
Refills: 0 | Status: DISCONTINUED | OUTPATIENT
Start: 2021-10-12 | End: 2021-10-12

## 2021-10-12 RX ORDER — ACETAMINOPHEN 500 MG
650 TABLET ORAL ONCE
Refills: 0 | Status: COMPLETED | OUTPATIENT
Start: 2021-10-12 | End: 2021-10-12

## 2021-10-12 RX ORDER — LANOLIN
1 OINTMENT (GRAM) TOPICAL EVERY 6 HOURS
Refills: 0 | Status: DISCONTINUED | OUTPATIENT
Start: 2021-10-12 | End: 2021-10-13

## 2021-10-12 RX ORDER — KETOROLAC TROMETHAMINE 30 MG/ML
30 SYRINGE (ML) INJECTION ONCE
Refills: 0 | Status: DISCONTINUED | OUTPATIENT
Start: 2021-10-12 | End: 2021-10-12

## 2021-10-12 RX ORDER — AER TRAVELER 0.5 G/1
1 SOLUTION RECTAL; TOPICAL EVERY 4 HOURS
Refills: 0 | Status: DISCONTINUED | OUTPATIENT
Start: 2021-10-12 | End: 2021-10-13

## 2021-10-12 RX ORDER — INFLUENZA VIRUS VACCINE 15; 15; 15; 15 UG/.5ML; UG/.5ML; UG/.5ML; UG/.5ML
0.5 SUSPENSION INTRAMUSCULAR ONCE
Refills: 0 | Status: DISCONTINUED | OUTPATIENT
Start: 2021-10-12 | End: 2021-10-13

## 2021-10-12 RX ORDER — TETANUS TOXOID, REDUCED DIPHTHERIA TOXOID AND ACELLULAR PERTUSSIS VACCINE, ADSORBED 5; 2.5; 8; 8; 2.5 [IU]/.5ML; [IU]/.5ML; UG/.5ML; UG/.5ML; UG/.5ML
0.5 SUSPENSION INTRAMUSCULAR ONCE
Refills: 0 | Status: DISCONTINUED | OUTPATIENT
Start: 2021-10-12 | End: 2021-10-13

## 2021-10-12 RX ORDER — MAGNESIUM HYDROXIDE 400 MG/1
30 TABLET, CHEWABLE ORAL
Refills: 0 | Status: DISCONTINUED | OUTPATIENT
Start: 2021-10-12 | End: 2021-10-13

## 2021-10-12 RX ORDER — IBUPROFEN 200 MG
600 TABLET ORAL EVERY 6 HOURS
Refills: 0 | Status: COMPLETED | OUTPATIENT
Start: 2021-10-12 | End: 2022-09-10

## 2021-10-12 RX ORDER — AMPICILLIN TRIHYDRATE 250 MG
2 CAPSULE ORAL ONCE
Refills: 0 | Status: COMPLETED | OUTPATIENT
Start: 2021-10-12 | End: 2021-10-12

## 2021-10-12 RX ORDER — SODIUM CHLORIDE 9 MG/ML
1000 INJECTION, SOLUTION INTRAVENOUS
Refills: 0 | Status: DISCONTINUED | OUTPATIENT
Start: 2021-10-12 | End: 2021-10-12

## 2021-10-12 RX ORDER — SIMETHICONE 80 MG/1
80 TABLET, CHEWABLE ORAL EVERY 4 HOURS
Refills: 0 | Status: DISCONTINUED | OUTPATIENT
Start: 2021-10-12 | End: 2021-10-13

## 2021-10-12 RX ORDER — OXYCODONE HYDROCHLORIDE 5 MG/1
5 TABLET ORAL
Refills: 0 | Status: DISCONTINUED | OUTPATIENT
Start: 2021-10-12 | End: 2021-10-13

## 2021-10-12 RX ORDER — OXYTOCIN 10 UNIT/ML
333.33 VIAL (ML) INJECTION
Qty: 20 | Refills: 0 | Status: DISCONTINUED | OUTPATIENT
Start: 2021-10-12 | End: 2021-10-12

## 2021-10-12 RX ORDER — OXYTOCIN 10 UNIT/ML
2 VIAL (ML) INJECTION
Qty: 30 | Refills: 0 | Status: DISCONTINUED | OUTPATIENT
Start: 2021-10-12 | End: 2021-10-12

## 2021-10-12 RX ADMIN — Medication 600 MILLIGRAM(S): at 18:24

## 2021-10-12 RX ADMIN — Medication 650 MILLIGRAM(S): at 04:30

## 2021-10-12 RX ADMIN — Medication 975 MILLIGRAM(S): at 21:46

## 2021-10-12 RX ADMIN — Medication 108 GRAM(S): at 07:54

## 2021-10-12 RX ADMIN — Medication 975 MILLIGRAM(S): at 15:00

## 2021-10-12 RX ADMIN — Medication 975 MILLIGRAM(S): at 23:02

## 2021-10-12 RX ADMIN — Medication 2 MILLIUNIT(S)/MIN: at 09:12

## 2021-10-12 RX ADMIN — Medication 216 GRAM(S): at 03:52

## 2021-10-12 RX ADMIN — Medication 600 MILLIGRAM(S): at 19:20

## 2021-10-12 RX ADMIN — Medication 1000 MILLIUNIT(S)/MIN: at 10:32

## 2021-10-12 RX ADMIN — Medication 30 MILLIGRAM(S): at 10:33

## 2021-10-12 NOTE — OB PROVIDER DELIVERY SUMMARY - NSPROVIDERDELIVERYNOTE_OBGYN_ALL_OB_FT
Pt became fully dilated and pushed well over intact perineum, delivery of head in BRENT position, nose and mouth bulb suctioned on perineum, followed by delivery of shoulders and body without difficulty, live male infant, APGARs 9/9, 3-vessel cord + placenta complete, no complications, CBR+ collected

## 2021-10-12 NOTE — OB PROVIDER H&P - NSHPPHYSICALEXAM_GEN_ALL_CORE
T(C): 36.8 (10-12-21 @ 03:13), Max: 36.8 (10-12-21 @ 03:13)  HR: 109 (10-12-21 @ 03:46) (109 - 113)  BP: 120/88 (10-12-21 @ 03:46) (116/64 - 120/88)  RR: 18 (10-12-21 @ 03:13) (18 - 18)      Gen: A+OX3. NAD  Abd: Soft, Nontender. Gravid. Mild palpable contractions  SVE: 7/80/-1, vtx, ruptured per Dr. Duff     FHR:  135BPM/mod gunner/accels  TOCO: q4-6      EFW by Leopolds: 3200

## 2021-10-12 NOTE — OB PROVIDER H&P - HISTORY OF PRESENT ILLNESS
1/20/21 c/w first trimester sono, GLORIA 10/27/21, presenting with complaint of LOF. Patient reports gush of clear fluid at 0130 associated with continual leakage and contractions. Contractions have since intensified, occurring every 1/20/21 c/w first trimester sono, GLORIA 10/27/21, presenting with complaint of LOF. Patient reports gush of clear fluid at 0130 associated with continual leakage and contractions. Contractions have since intensified, occurring every few minutes, rated 7-8/10. Denies VB. Good FM. Pregnancy complicated by AMA with low risk NIPTs. H/o asthma, hospitalized once as a child, on daily symbicort and albuterol, last used albuterol 2 weeks ago. No other complications. GBS pos.

## 2021-10-12 NOTE — OB PROVIDER H&P - ASSESSMENT
36y  at 37w6d, GBS pos, AMA with low risk NIPTs, mild intermittent asthma, in active labor  -Admit to L+D  -Monitor EFM and TOCO   -IVF and labs  -Pain control PRN  -Clear liquid diet as tolerated  -Ampicillin for GBS ppx    Dr. Duff and Dr. Sams to be made aware

## 2021-10-12 NOTE — OB RN DELIVERY SUMMARY - NS_SEPSISRSKCALC_OBGYN_ALL_OB_FT
EOS calculated successfully. EOS Risk Factor: 0.5/1000 live births ([facility] national incidence); GA=37w6d; Temp=98.2; ROM=8.833; GBS='Positive'; Antibiotics='GBS specific antibiotics > 2 hrs prior to birth'

## 2021-10-12 NOTE — OB PROVIDER H&P - CURRENT PREGNANCY COMPLICATIONS, OB PROFILE
hyperemesis gravidarum X 5 months/Other Other/hyperemesis gravidarum X 5 months/Maternal Positive GBS

## 2021-10-12 NOTE — OB PROVIDER H&P - NS_OBGYNHISTORY_OBGYN_ALL_OB_FT
OB:  P1 2011 FT 5lb2oz M   P2 8/3/2012 FT 6lb80z F   SABx1 no D+C    GYN: denies history of fibroids, ovarian cysts, abnormal papsmears, and STIs

## 2021-10-12 NOTE — OB RN DELIVERY SUMMARY - NSSELHIDDEN_OBGYN_ALL_OB_FT
[NS_DeliveryAttending1_OBGYN_ALL_OB_FT:[id number]=],[NS_DeliveryRN_OBGYN_ALL_OB_FT:[email or web address]=]

## 2021-10-12 NOTE — OB PROVIDER H&P - NSHPLABSRESULTS_GEN_ALL_CORE
GCT: 118    37w0d FH+, vtx, posterior placenta, MVP 6.04cm, BPP 8/8, EFW 3197 67%  31w0d FH+, vtx, posterior placenta, MVP4.48cm, BPP 8/8, EFW 1842 66%  21w0d FH+, breech, posterior placenta,  92%, normal anatomy  12w0d FH+, NT 2.0mm

## 2021-10-12 NOTE — OB PROVIDER DELIVERY SUMMARY - NSSELHIDDEN_OBGYN_ALL_OB_FT
[NS_DeliveryAttending1_OBGYN_ALL_OB_FT:[id number]=],[NS_DeliveryRN_OBGYN_ALL_OB_FT:[email or web address]=]

## 2021-10-12 NOTE — OB RN PATIENT PROFILE - NS_OBGYNHISTORY_OBGYN_ALL_OB_FT
Quality 110: Preventive Care And Screening: Influenza Immunization: Influenza Immunization Administered during Influenza season Quality 111:Pneumonia Vaccination Status For Older Adults: Pneumococcal Vaccination Previously Received Quality 130: Documentation Of Current Medications In The Medical Record: Current Medications Documented Detail Level: Detailed Quality 431: Preventive Care And Screening: Unhealthy Alcohol Use - Screening: Patient screened for unhealthy alcohol use using a single question and scores less than 2 times per year Quality 402: Tobacco Use And Help With Quitting Among Adolescents: Patient screened for tobacco and never smoked OB:  P1 2011 FT 5lb2oz M   P2 8/3/2012 FT 6lb80z F   SABx1 no D+C    GYN: denies history of fibroids, ovarian cysts, abnormal papsmears, and STIs

## 2021-10-13 ENCOUNTER — TRANSCRIPTION ENCOUNTER (OUTPATIENT)
Age: 36
End: 2021-10-13

## 2021-10-13 VITALS
HEART RATE: 89 BPM | RESPIRATION RATE: 18 BRPM | DIASTOLIC BLOOD PRESSURE: 64 MMHG | TEMPERATURE: 97 F | SYSTOLIC BLOOD PRESSURE: 112 MMHG

## 2021-10-13 LAB
BASOPHILS # BLD AUTO: 0.03 K/UL — SIGNIFICANT CHANGE UP (ref 0–0.2)
BASOPHILS NFR BLD AUTO: 0.3 % — SIGNIFICANT CHANGE UP (ref 0–1)
COVID-19 SPIKE DOMAIN AB INTERP: POSITIVE
COVID-19 SPIKE DOMAIN ANTIBODY RESULT: 20.1 U/ML — HIGH
EOSINOPHIL # BLD AUTO: 0.1 K/UL — SIGNIFICANT CHANGE UP (ref 0–0.7)
EOSINOPHIL NFR BLD AUTO: 1.1 % — SIGNIFICANT CHANGE UP (ref 0–8)
HCT VFR BLD CALC: 37 % — SIGNIFICANT CHANGE UP (ref 37–47)
HGB BLD-MCNC: 12.3 G/DL — SIGNIFICANT CHANGE UP (ref 12–16)
IMM GRANULOCYTES NFR BLD AUTO: 0.5 % — HIGH (ref 0.1–0.3)
LYMPHOCYTES # BLD AUTO: 1.64 K/UL — SIGNIFICANT CHANGE UP (ref 1.2–3.4)
LYMPHOCYTES # BLD AUTO: 17.6 % — LOW (ref 20.5–51.1)
MCHC RBC-ENTMCNC: 31.5 PG — HIGH (ref 27–31)
MCHC RBC-ENTMCNC: 33.2 G/DL — SIGNIFICANT CHANGE UP (ref 32–37)
MCV RBC AUTO: 94.6 FL — SIGNIFICANT CHANGE UP (ref 81–99)
MONOCYTES # BLD AUTO: 0.72 K/UL — HIGH (ref 0.1–0.6)
MONOCYTES NFR BLD AUTO: 7.7 % — SIGNIFICANT CHANGE UP (ref 1.7–9.3)
NEUTROPHILS # BLD AUTO: 6.8 K/UL — HIGH (ref 1.4–6.5)
NEUTROPHILS NFR BLD AUTO: 72.8 % — SIGNIFICANT CHANGE UP (ref 42.2–75.2)
NRBC # BLD: 0 /100 WBCS — SIGNIFICANT CHANGE UP (ref 0–0)
PLATELET # BLD AUTO: 148 K/UL — SIGNIFICANT CHANGE UP (ref 130–400)
RBC # BLD: 3.91 M/UL — LOW (ref 4.2–5.4)
RBC # FLD: 13.8 % — SIGNIFICANT CHANGE UP (ref 11.5–14.5)
SARS-COV-2 IGG+IGM SERPL QL IA: 20.1 U/ML — HIGH
SARS-COV-2 IGG+IGM SERPL QL IA: POSITIVE
WBC # BLD: 9.34 K/UL — SIGNIFICANT CHANGE UP (ref 4.8–10.8)
WBC # FLD AUTO: 9.34 K/UL — SIGNIFICANT CHANGE UP (ref 4.8–10.8)

## 2021-10-13 RX ORDER — CITRIC ACID/SODIUM CITRATE 300-500 MG
30 SOLUTION, ORAL ORAL ONCE
Refills: 0 | Status: COMPLETED | OUTPATIENT
Start: 2021-10-13 | End: 2021-10-13

## 2021-10-13 RX ORDER — ACETAMINOPHEN 500 MG
3 TABLET ORAL
Qty: 0 | Refills: 0 | DISCHARGE
Start: 2021-10-13

## 2021-10-13 RX ORDER — IBUPROFEN 200 MG
1 TABLET ORAL
Qty: 0 | Refills: 0 | DISCHARGE
Start: 2021-10-13

## 2021-10-13 RX ADMIN — Medication 600 MILLIGRAM(S): at 11:51

## 2021-10-13 RX ADMIN — SIMETHICONE 80 MILLIGRAM(S): 80 TABLET, CHEWABLE ORAL at 00:27

## 2021-10-13 RX ADMIN — Medication 600 MILLIGRAM(S): at 06:42

## 2021-10-13 RX ADMIN — Medication 600 MILLIGRAM(S): at 00:26

## 2021-10-13 RX ADMIN — Medication 30 MILLILITER(S): at 06:43

## 2021-10-13 RX ADMIN — Medication 1 TABLET(S): at 11:51

## 2021-10-13 RX ADMIN — Medication 975 MILLIGRAM(S): at 08:28

## 2021-10-13 NOTE — DISCHARGE NOTE OB - PATIENT PORTAL LINK FT
You can access the FollowMyHealth Patient Portal offered by [facility] by registering at the following website: [email or web address]. By joining [employer]’s FollowMyHealth portal, you will also be able to view your health information using other applications (apps) compatible with our system.

## 2021-10-13 NOTE — DISCHARGE NOTE OB - MEDICATION SUMMARY - MEDICATIONS TO STOP TAKING
I will STOP taking the medications listed below when I get home from the hospital:    Macrobid 100 mg oral capsule  -- 1 cap(s) by mouth 2 times a day   -- Finish all this medication unless otherwise directed by prescriber.  May discolor urine or feces.  Take with food or milk.    Zofran 4 mg oral tablet  -- 1 tab(s) by mouth every 8 hours

## 2021-10-13 NOTE — DISCHARGE NOTE OB - CARE PLAN
1 Principal Discharge DX:	Vaginal delivery  Assessment and plan of treatment:	No heavy lifting.   Nothing inside the vagina for 6 weeks: no tampons, douching, sexual intercourse, tub baths or pools.   If you have a fever over 100.4F, severe abdominal pain or heavy vaginal bleeding please call your doctor or go to the emergency room.  Please follow up with your OBGYN in 6 weeks for a postpartum visit.

## 2021-10-13 NOTE — PROGRESS NOTE ADULT - SUBJECTIVE AND OBJECTIVE BOX
Pt seen at bedside, no complaints, bottle feeding    Vital Signs Last 24 Hrs  T(C): 35.9 (13 Oct 2021 08:08), Max: 35.9 (13 Oct 2021 08:08)  T(F): 96.7 (13 Oct 2021 08:08), Max: 96.7 (13 Oct 2021 08:08)  HR: 89 (13 Oct 2021 08:08) (61 - 89)  BP: 112/64 (13 Oct 2021 08:08) (95/51 - 113/69)  RR: 18 (13 Oct 2021 08:08) (16 - 19)    Resp - CTA b/l  CVS - S1S2+, RRR  Breasts - soft, NT  Abd - soft, NTND, BS+, uterus - firm  VE - Minimal lochia, perineum- intact  Ext - No Homans                          12.3   9.34  )-----------( 148      ( 13 Oct 2021 06:47 )             37.0                         13.0   9.83  )-----------( 151      ( 12 Oct 2021 04:11 )             38.6     A POS, Rubella - Immune, Rubeola - Immune, RPR - NR

## 2021-10-13 NOTE — DISCHARGE NOTE OB - CARE PROVIDER_API CALL
Bairon Sams)  Obstetrics and Gynecology  [address] [place] [address]  Phone: [phone]  Fax: [phone]  Follow Up Time:

## 2021-10-15 ENCOUNTER — APPOINTMENT (OUTPATIENT)
Dept: ANTEPARTUM | Facility: CLINIC | Age: 36
End: 2021-10-15

## 2021-10-15 DIAGNOSIS — J45.909 UNSPECIFIED ASTHMA, UNCOMPLICATED: ICD-10-CM

## 2021-10-15 DIAGNOSIS — Z3A.37 37 WEEKS GESTATION OF PREGNANCY: ICD-10-CM

## 2021-11-04 NOTE — OB RN DELIVERY SUMMARY - NS_TRANSFUSREACT_OBGYN_ALL_OB
No Bactrim Pregnancy And Lactation Text: This medication is Pregnancy Category D and is known to cause fetal risk.  It is also excreted in breast milk.

## 2021-12-03 NOTE — ED PROVIDER NOTE - TOBACCO USE
[facility] THERAPY MISSED TREATMENT NOTE  [facility] ICU 4D  4D-16/016-A      Date: 12/3/2021  Patient Name: Delfino Matta        CSN: [id number]   : 1991  (35 y.o.)  Gender: male                REASON FOR MISSED TREATMENT: Pt to have surgery per ortho on Monday for pelvic fractures and right hip Acetabulum fracture fracture. Will discontinue OT orders and await to be reorder after surgery and when pt is appropriate. Jelly ELLISON made aware. Never smoker

## 2022-04-12 ENCOUNTER — APPOINTMENT (OUTPATIENT)
Dept: ANTEPARTUM | Facility: CLINIC | Age: 37
End: 2022-04-12
Payer: COMMERCIAL

## 2022-04-12 ENCOUNTER — ASOB RESULT (OUTPATIENT)
Age: 37
End: 2022-04-12

## 2022-04-12 VITALS
DIASTOLIC BLOOD PRESSURE: 68 MMHG | BODY MASS INDEX: 22.04 KG/M2 | HEART RATE: 66 BPM | WEIGHT: 105 LBS | SYSTOLIC BLOOD PRESSURE: 100 MMHG | HEIGHT: 58 IN

## 2022-04-12 DIAGNOSIS — Z36.86 ENCOUNTER FOR ANTENATAL SCREENING FOR CERVICAL LENGTH: ICD-10-CM

## 2022-04-12 DIAGNOSIS — O09.523 SUPERVISION OF ELDERLY MULTIGRAVIDA, THIRD TRIMESTER: ICD-10-CM

## 2022-04-12 DIAGNOSIS — Z36.3 ENCOUNTER FOR ANTENATAL SCREENING FOR MALFORMATIONS: ICD-10-CM

## 2022-04-12 PROCEDURE — 76813 OB US NUCHAL MEAS 1 GEST: CPT

## 2022-05-04 NOTE — OB PROVIDER H&P - NSLASTLIVEBIRTH_OBGYN_ALL_OB_DT
03-Aug-2012 10 Mm Punch Excision Text: A 10 mm punch biopsy was used to excise the lesion to the level of the subcutaneous fat.  Blunt dissection was used to free the lesion from the surrounding tissues and the lesion was removed.

## 2022-05-19 NOTE — OB RN PATIENT PROFILE - NSICDXPASTMEDICALHX_GEN_ALL_CORE_FT
(E4) spontaneous
PAST MEDICAL HISTORY:  Mild intermittent asthma, unspecified whether complicated

## 2022-06-06 ENCOUNTER — APPOINTMENT (OUTPATIENT)
Dept: ANTEPARTUM | Facility: CLINIC | Age: 37
End: 2022-06-06
Payer: COMMERCIAL

## 2022-06-06 ENCOUNTER — ASOB RESULT (OUTPATIENT)
Age: 37
End: 2022-06-06

## 2022-06-06 VITALS
HEART RATE: 84 BPM | WEIGHT: 116 LBS | SYSTOLIC BLOOD PRESSURE: 100 MMHG | HEIGHT: 58 IN | DIASTOLIC BLOOD PRESSURE: 72 MMHG | BODY MASS INDEX: 24.35 KG/M2

## 2022-06-06 DIAGNOSIS — O09.529 SUPERVISION OF ELDERLY MULTIGRAVIDA, UNSPECIFIED TRIMESTER: ICD-10-CM

## 2022-06-06 DIAGNOSIS — O35.9XX0 MATERNAL CARE FOR (SUSPECTED) FETAL ABNORMALITY AND DAMAGE, UNSPECIFIED, NOT APPLICABLE OR UNSPECIFIED: ICD-10-CM

## 2022-06-06 PROCEDURE — 76817 TRANSVAGINAL US OBSTETRIC: CPT

## 2022-06-06 PROCEDURE — 76811 OB US DETAILED SNGL FETUS: CPT | Mod: 59

## 2022-08-22 ENCOUNTER — APPOINTMENT (OUTPATIENT)
Dept: ANTEPARTUM | Facility: CLINIC | Age: 37
End: 2022-08-22

## 2022-08-22 ENCOUNTER — ASOB RESULT (OUTPATIENT)
Age: 37
End: 2022-08-22

## 2022-08-22 VITALS — SYSTOLIC BLOOD PRESSURE: 110 MMHG | DIASTOLIC BLOOD PRESSURE: 76 MMHG

## 2022-08-22 VITALS — WEIGHT: 126 LBS | HEIGHT: 58 IN | BODY MASS INDEX: 26.45 KG/M2 | HEART RATE: 88 BPM

## 2022-08-22 PROCEDURE — 76816 OB US FOLLOW-UP PER FETUS: CPT

## 2022-10-03 ENCOUNTER — ASOB RESULT (OUTPATIENT)
Age: 37
End: 2022-10-03

## 2022-10-03 ENCOUNTER — APPOINTMENT (OUTPATIENT)
Dept: ANTEPARTUM | Facility: CLINIC | Age: 37
End: 2022-10-03

## 2022-10-03 ENCOUNTER — NON-APPOINTMENT (OUTPATIENT)
Age: 37
End: 2022-10-03

## 2022-10-03 VITALS
DIASTOLIC BLOOD PRESSURE: 68 MMHG | SYSTOLIC BLOOD PRESSURE: 106 MMHG | BODY MASS INDEX: 26.87 KG/M2 | HEIGHT: 58 IN | WEIGHT: 128 LBS | HEART RATE: 80 BPM

## 2022-10-03 PROCEDURE — 76816 OB US FOLLOW-UP PER FETUS: CPT

## 2022-10-03 PROCEDURE — 76819 FETAL BIOPHYS PROFIL W/O NST: CPT | Mod: 59

## 2022-10-06 ENCOUNTER — INPATIENT (INPATIENT)
Facility: HOSPITAL | Age: 37
LOS: 1 days | Discharge: HOME | End: 2022-10-08
Attending: OBSTETRICS & GYNECOLOGY | Admitting: OBSTETRICS & GYNECOLOGY

## 2022-10-06 VITALS
HEART RATE: 116 BPM | SYSTOLIC BLOOD PRESSURE: 103 MMHG | WEIGHT: 126.1 LBS | DIASTOLIC BLOOD PRESSURE: 65 MMHG | HEIGHT: 59 IN | RESPIRATION RATE: 18 BRPM | TEMPERATURE: 98 F

## 2022-10-06 DIAGNOSIS — Z98.890 OTHER SPECIFIED POSTPROCEDURAL STATES: Chronic | ICD-10-CM

## 2022-10-06 DIAGNOSIS — Z98.82 BREAST IMPLANT STATUS: Chronic | ICD-10-CM

## 2022-10-06 RX ORDER — CHLORHEXIDINE GLUCONATE 213 G/1000ML
1 SOLUTION TOPICAL ONCE
Refills: 0 | Status: DISCONTINUED | OUTPATIENT
Start: 2022-10-06 | End: 2022-10-07

## 2022-10-06 RX ORDER — CITRIC ACID/SODIUM CITRATE 300-500 MG
15 SOLUTION, ORAL ORAL EVERY 6 HOURS
Refills: 0 | Status: DISCONTINUED | OUTPATIENT
Start: 2022-10-06 | End: 2022-10-07

## 2022-10-06 RX ORDER — OXYTOCIN 10 UNIT/ML
333.33 VIAL (ML) INJECTION
Qty: 20 | Refills: 0 | Status: DISCONTINUED | OUTPATIENT
Start: 2022-10-06 | End: 2022-10-07

## 2022-10-06 RX ORDER — SODIUM CHLORIDE 9 MG/ML
1000 INJECTION, SOLUTION INTRAVENOUS
Refills: 0 | Status: DISCONTINUED | OUTPATIENT
Start: 2022-10-06 | End: 2022-10-07

## 2022-10-06 NOTE — OB PROVIDER H&P - HISTORY OF PRESENT ILLNESS
38yo  at 37w2d presents to L&D after evaluation at PMD's office for contractions. During evaluation at PMD's office, experienced spontaneous clear leakage of fluid. Reports good continuos leakage of fluid. Denies vaginal bleeding. GBS neg     Pregnancy complicated by:   -advanced maternal age   -asthma. Hospitalized once as a child, on daily symbicort and albuterol 38yo  at 37w2d presents to L&D after evaluation at PMD's office for contractions. During evaluation at PMD's office, experienced spontaneous clear leakage of fluid at 2230. Reports good continuos leakage of fluid. Denies vaginal bleeding. GBS neg     Pregnancy complicated by:   -advanced maternal age   -asthma. Hospitalized once as a child, on daily symbicort and albuterol

## 2022-10-06 NOTE — OB RN PATIENT PROFILE - FALL HARM RISK - UNIVERSAL INTERVENTIONS
Bed in lowest position, wheels locked, appropriate side rails in place/Call bell, personal items and telephone in reach/Instruct patient to call for assistance before getting out of bed or chair/Non-slip footwear when patient is out of bed/[place] to call system/Physically safe environment - no spills, clutter or unnecessary equipment/Purposeful Proactive Rounding/Room/bathroom lighting operational, light cord in reach

## 2022-10-06 NOTE — OB PROVIDER H&P - NS_OBGYNHISTORY_OBGYN_ALL_OB_FT
OB Hx:  P1 2011 FT 5lb2oz M   P2 8/3/2012 FT 6lb80z F   P3 10/12/2021 FT , 7-0, M  SABx1 no D+C    GYN: denies history of fibroids, ovarian cysts, abnormal papsmears, and STIs

## 2022-10-06 NOTE — OB PROVIDER H&P - ASSESSMENT
A/P: 36yo  at 37w2d, Rh pos, GBS neg; AMA, asthma; in labor   -admit to labor and delivery  -pain management prn   -continous efm & toco  -admission labs  -IV access   -IV hydration   -diet: clear liquid diet   -COVID-19 PCR swab     Discussed with Dr. Sams

## 2022-10-06 NOTE — OB PROVIDER H&P - NSPRENATALGBS_OBGYN_ALL_OB_START_DATE
29-Sep-2022 Niacinamide Counseling: I recommended taking niacin or niacinamide, also know as vitamin B3, twice daily. Recent evidence suggests that taking vitamin B3 (500 mg twice daily) can reduce the risk of actinic keratoses and non-melanoma skin cancers. Side effects of vitamin B3 include flushing and headache.

## 2022-10-06 NOTE — OB PROVIDER H&P - NSHPLABSRESULTS_GEN_ALL_CORE
Labs:  HbSAg: neg   HIV: neg   RPR: neg   Blood Type: A pos   Antibody Screen: neg   Rubella: immune  Varicella: immune  Measles: immune  GCT: 131  GBS: neg      Ultrasounds:  @12w0d: CRL 65.62mm, NT 2.5mm   @19w6d: EFW 383g, breech, posterior placenta, MVP 6.3cm, no fetal anomalies noted   @30w6d: EFW 1919g (81%), vtx, posterior placenta, MVP 5.75cm  @36w6d: EFW 3357g(83%), vtx, posterior placenta, MVP 4.35cm, BPP 8/8

## 2022-10-06 NOTE — OB PROVIDER H&P - NSHPPHYSICALEXAM_GEN_ALL_CORE
Vital Signs Last 24 Hrs  T(C): 36.8 (06 Oct 2022 23:35), Max: 36.8 (06 Oct 2022 23:35)  T(F): 98.2 (06 Oct 2022 23:35), Max: 98.2 (06 Oct 2022 23:35)  HR: 116 (06 Oct 2022 23:35) (116 - 116)  BP: 103/65 (06 Oct 2022 23:35) (103/65 - 103/65)  RR: 18 (06 Oct 2022 23:35) (18 - 18)      Physical Exam:   GA: AOX3, no apparent distress  Resp: CTAB  Cardio: RRR  Abd: soft, nontender, no palpable ctx, gravid  Extr: no erythema or pain to palpation bilaterally   SVE: 5cm and grossly ruptured per PMD     EFM: 145bpm/moderate variability/+accels   [place]: q2-4mins Vital Signs Last 24 Hrs  T(C): 36.8 (06 Oct 2022 23:35), Max: 36.8 (06 Oct 2022 23:35)  T(F): 98.2 (06 Oct 2022 23:35), Max: 98.2 (06 Oct 2022 23:35)  HR: 116 (06 Oct 2022 23:35) (116 - 116)  BP: 103/65 (06 Oct 2022 23:35) (103/65 - 103/65)  RR: 18 (06 Oct 2022 23:35) (18 - 18)      Physical Exam:   GA: AOX3, no apparent distress  Resp: CTAB  Cardio: RRR  Abd: soft, nontender, no palpable ctx, gravid  Extr: no erythema or pain to palpation bilaterally   SVE: 5/70/-2, grossly ruptured, vtx per PMD     EFM: 145bpm/moderate variability/+accels   [place]: q2-4mins

## 2022-10-07 LAB
6-ACETYLMORPHINE, UR RESULT: NEGATIVE NG/ML — SIGNIFICANT CHANGE UP
6MAM UR CFM-MCNC: NEGATIVE NG/ML — SIGNIFICANT CHANGE UP
AMPHET UR-MCNC: NEGATIVE — SIGNIFICANT CHANGE UP
APPEARANCE UR: CLEAR — SIGNIFICANT CHANGE UP
BACTERIA # UR AUTO: NEGATIVE — SIGNIFICANT CHANGE UP
BARBITURATES UR SCN-MCNC: NEGATIVE — SIGNIFICANT CHANGE UP
BASOPHILS # BLD AUTO: 0.01 K/UL — SIGNIFICANT CHANGE UP (ref 0–0.2)
BASOPHILS # BLD AUTO: 0.02 K/UL — SIGNIFICANT CHANGE UP (ref 0–0.2)
BASOPHILS NFR BLD AUTO: 0.1 % — SIGNIFICANT CHANGE UP (ref 0–1)
BASOPHILS NFR BLD AUTO: 0.2 % — SIGNIFICANT CHANGE UP (ref 0–1)
BENZODIAZ UR-MCNC: NEGATIVE — SIGNIFICANT CHANGE UP
BILIRUB UR-MCNC: NEGATIVE — SIGNIFICANT CHANGE UP
BLD GP AB SCN SERPL QL: SIGNIFICANT CHANGE UP
BUPRENORPHINE SCREEN, URINE RESULT: NEGATIVE — SIGNIFICANT CHANGE UP
COCAINE METAB.OTHER UR-MCNC: NEGATIVE — SIGNIFICANT CHANGE UP
CODEINE UR CFM-MCNC: NEGATIVE NG/ML — SIGNIFICANT CHANGE UP
CODEINE, UR RESULT: NEGATIVE NG/ML — SIGNIFICANT CHANGE UP
COLOR SPEC: YELLOW — SIGNIFICANT CHANGE UP
DIFF PNL FLD: NEGATIVE — SIGNIFICANT CHANGE UP
EOSINOPHIL # BLD AUTO: 0.03 K/UL — SIGNIFICANT CHANGE UP (ref 0–0.7)
EOSINOPHIL # BLD AUTO: 0.06 K/UL — SIGNIFICANT CHANGE UP (ref 0–0.7)
EOSINOPHIL NFR BLD AUTO: 0.3 % — SIGNIFICANT CHANGE UP (ref 0–8)
EOSINOPHIL NFR BLD AUTO: 0.6 % — SIGNIFICANT CHANGE UP (ref 0–8)
EPI CELLS # UR: 3 /HPF — SIGNIFICANT CHANGE UP (ref 0–5)
FENTANYL UR QL: NEGATIVE — SIGNIFICANT CHANGE UP
GLUCOSE UR QL: ABNORMAL
HCT VFR BLD CALC: 34 % — LOW (ref 37–47)
HCT VFR BLD CALC: 34.1 % — LOW (ref 37–47)
HGB BLD-MCNC: 11.4 G/DL — LOW (ref 12–16)
HGB BLD-MCNC: 11.8 G/DL — LOW (ref 12–16)
HYALINE CASTS # UR AUTO: 2 /LPF — SIGNIFICANT CHANGE UP (ref 0–7)
HYDROCODONE UR QL CFM: NEGATIVE NG/ML — SIGNIFICANT CHANGE UP
HYDROCODONE, UR RESULT: NEGATIVE NG/ML — SIGNIFICANT CHANGE UP
HYDROMORPHONE UR QL CFM: NEGATIVE NG/ML — SIGNIFICANT CHANGE UP
HYDROMORPHONE, UR RESULT: NEGATIVE NG/ML — SIGNIFICANT CHANGE UP
IMM GRANULOCYTES NFR BLD AUTO: 0.3 % — SIGNIFICANT CHANGE UP (ref 0.1–0.3)
IMM GRANULOCYTES NFR BLD AUTO: 0.4 % — HIGH (ref 0.1–0.3)
KETONES UR-MCNC: ABNORMAL
L&D DRUG SCREEN, URINE: SIGNIFICANT CHANGE UP
LEUKOCYTE ESTERASE UR-ACNC: NEGATIVE — SIGNIFICANT CHANGE UP
LYMPHOCYTES # BLD AUTO: 1.19 K/UL — LOW (ref 1.2–3.4)
LYMPHOCYTES # BLD AUTO: 1.22 K/UL — SIGNIFICANT CHANGE UP (ref 1.2–3.4)
LYMPHOCYTES # BLD AUTO: 12 % — LOW (ref 20.5–51.1)
LYMPHOCYTES # BLD AUTO: 12.8 % — LOW (ref 20.5–51.1)
MCHC RBC-ENTMCNC: 32.3 PG — HIGH (ref 27–31)
MCHC RBC-ENTMCNC: 32.9 PG — HIGH (ref 27–31)
MCHC RBC-ENTMCNC: 33.5 G/DL — SIGNIFICANT CHANGE UP (ref 32–37)
MCHC RBC-ENTMCNC: 34.6 G/DL — SIGNIFICANT CHANGE UP (ref 32–37)
MCV RBC AUTO: 95 FL — SIGNIFICANT CHANGE UP (ref 81–99)
MCV RBC AUTO: 96.3 FL — SIGNIFICANT CHANGE UP (ref 81–99)
METHADONE UR-MCNC: NEGATIVE — SIGNIFICANT CHANGE UP
MONOCYTES # BLD AUTO: 0.64 K/UL — HIGH (ref 0.1–0.6)
MONOCYTES # BLD AUTO: 0.82 K/UL — HIGH (ref 0.1–0.6)
MONOCYTES NFR BLD AUTO: 6.3 % — SIGNIFICANT CHANGE UP (ref 1.7–9.3)
MONOCYTES NFR BLD AUTO: 8.8 % — SIGNIFICANT CHANGE UP (ref 1.7–9.3)
MORPHINE UR QL CFM: NEGATIVE NG/ML — SIGNIFICANT CHANGE UP
MORPHINE, UR RESULT: NEGATIVE NG/ML — SIGNIFICANT CHANGE UP
NEUTROPHILS # BLD AUTO: 7.22 K/UL — HIGH (ref 1.4–6.5)
NEUTROPHILS # BLD AUTO: 8.18 K/UL — HIGH (ref 1.4–6.5)
NEUTROPHILS NFR BLD AUTO: 77.4 % — HIGH (ref 42.2–75.2)
NEUTROPHILS NFR BLD AUTO: 80.8 % — HIGH (ref 42.2–75.2)
NITRITE UR-MCNC: NEGATIVE — SIGNIFICANT CHANGE UP
NOROXYCODONE (OPIATES), UR RESULT: NEGATIVE NG/ML — SIGNIFICANT CHANGE UP
NOROXYCODONE UR CFM-MCNC: NEGATIVE NG/ML — SIGNIFICANT CHANGE UP
NRBC # BLD: 0 /100 WBCS — SIGNIFICANT CHANGE UP (ref 0–0)
NRBC # BLD: 0 /100 WBCS — SIGNIFICANT CHANGE UP (ref 0–0)
OPIATES IN-HOUSE INTERPRETATION: NEGATIVE — SIGNIFICANT CHANGE UP
OPIATES UR QL CFM: NEGATIVE — SIGNIFICANT CHANGE UP
OPIATES UR-MCNC: POSITIVE
OXYCODONE (OPIATES), UR RESULT: NEGATIVE NG/ML — SIGNIFICANT CHANGE UP
OXYCODONE UR-MCNC: NEGATIVE NG/ML — SIGNIFICANT CHANGE UP
OXYCODONE UR-MCNC: NEGATIVE — SIGNIFICANT CHANGE UP
OXYMORPHONE (OPIATES), UR RESULT: NEGATIVE NG/ML — SIGNIFICANT CHANGE UP
OXYMORPHONE UR CFM-MCNC: NEGATIVE NG/ML — SIGNIFICANT CHANGE UP
PCP UR-MCNC: NEGATIVE — SIGNIFICANT CHANGE UP
PH UR: 6.5 — SIGNIFICANT CHANGE UP (ref 5–8)
PLATELET # BLD AUTO: 135 K/UL — SIGNIFICANT CHANGE UP (ref 130–400)
PLATELET # BLD AUTO: 154 K/UL — SIGNIFICANT CHANGE UP (ref 130–400)
PRENATAL SYPHILIS TEST: SIGNIFICANT CHANGE UP
PROPOXYPHENE QUALITATIVE URINE RESULT: NEGATIVE — SIGNIFICANT CHANGE UP
PROT UR-MCNC: ABNORMAL
RBC # BLD: 3.53 M/UL — LOW (ref 4.2–5.4)
RBC # BLD: 3.59 M/UL — LOW (ref 4.2–5.4)
RBC # FLD: 14.5 % — SIGNIFICANT CHANGE UP (ref 11.5–14.5)
RBC # FLD: 14.6 % — HIGH (ref 11.5–14.5)
RBC CASTS # UR COMP ASSIST: 2 /HPF — SIGNIFICANT CHANGE UP (ref 0–4)
SARS-COV-2 RNA SPEC QL NAA+PROBE: SIGNIFICANT CHANGE UP
SP GR SPEC: 1.04 — HIGH (ref 1.01–1.03)
UROBILINOGEN FLD QL: ABNORMAL
WBC # BLD: 10.13 K/UL — SIGNIFICANT CHANGE UP (ref 4.8–10.8)
WBC # BLD: 9.33 K/UL — SIGNIFICANT CHANGE UP (ref 4.8–10.8)
WBC # FLD AUTO: 10.13 K/UL — SIGNIFICANT CHANGE UP (ref 4.8–10.8)
WBC # FLD AUTO: 9.33 K/UL — SIGNIFICANT CHANGE UP (ref 4.8–10.8)
WBC UR QL: 2 /HPF — SIGNIFICANT CHANGE UP (ref 0–5)

## 2022-10-07 RX ORDER — IBUPROFEN 200 MG
600 TABLET ORAL EVERY 6 HOURS
Refills: 0 | Status: DISCONTINUED | OUTPATIENT
Start: 2022-10-07 | End: 2022-10-08

## 2022-10-07 RX ORDER — CITRIC ACID/SODIUM CITRATE 300-500 MG
15 SOLUTION, ORAL ORAL ONCE
Refills: 0 | Status: COMPLETED | OUTPATIENT
Start: 2022-10-07 | End: 2022-10-07

## 2022-10-07 RX ORDER — MAGNESIUM HYDROXIDE 400 MG/1
30 TABLET, CHEWABLE ORAL
Refills: 0 | Status: DISCONTINUED | OUTPATIENT
Start: 2022-10-07 | End: 2022-10-08

## 2022-10-07 RX ORDER — IBUPROFEN 200 MG
600 TABLET ORAL EVERY 6 HOURS
Refills: 0 | Status: COMPLETED | OUTPATIENT
Start: 2022-10-07 | End: 2023-09-05

## 2022-10-07 RX ORDER — PRAMOXINE HYDROCHLORIDE 150 MG/15G
1 AEROSOL, FOAM RECTAL EVERY 4 HOURS
Refills: 0 | Status: DISCONTINUED | OUTPATIENT
Start: 2022-10-07 | End: 2022-10-08

## 2022-10-07 RX ORDER — TETANUS TOXOID, REDUCED DIPHTHERIA TOXOID AND ACELLULAR PERTUSSIS VACCINE, ADSORBED 5; 2.5; 8; 8; 2.5 [IU]/.5ML; [IU]/.5ML; UG/.5ML; UG/.5ML; UG/.5ML
0.5 SUSPENSION INTRAMUSCULAR ONCE
Refills: 0 | Status: DISCONTINUED | OUTPATIENT
Start: 2022-10-07 | End: 2022-10-08

## 2022-10-07 RX ORDER — DIPHENHYDRAMINE HCL 50 MG
25 CAPSULE ORAL EVERY 6 HOURS
Refills: 0 | Status: DISCONTINUED | OUTPATIENT
Start: 2022-10-07 | End: 2022-10-08

## 2022-10-07 RX ORDER — BENZOCAINE 10 %
1 GEL (GRAM) MUCOUS MEMBRANE EVERY 6 HOURS
Refills: 0 | Status: DISCONTINUED | OUTPATIENT
Start: 2022-10-07 | End: 2022-10-08

## 2022-10-07 RX ORDER — KETOROLAC TROMETHAMINE 30 MG/ML
30 SYRINGE (ML) INJECTION ONCE
Refills: 0 | Status: DISCONTINUED | OUTPATIENT
Start: 2022-10-07 | End: 2022-10-07

## 2022-10-07 RX ORDER — HYDROCORTISONE 1 %
1 OINTMENT (GRAM) TOPICAL EVERY 6 HOURS
Refills: 0 | Status: DISCONTINUED | OUTPATIENT
Start: 2022-10-07 | End: 2022-10-08

## 2022-10-07 RX ORDER — OXYTOCIN 10 UNIT/ML
333.33 VIAL (ML) INJECTION
Qty: 20 | Refills: 0 | Status: DISCONTINUED | OUTPATIENT
Start: 2022-10-07 | End: 2022-10-08

## 2022-10-07 RX ORDER — SODIUM CHLORIDE 9 MG/ML
3 INJECTION INTRAMUSCULAR; INTRAVENOUS; SUBCUTANEOUS EVERY 8 HOURS
Refills: 0 | Status: DISCONTINUED | OUTPATIENT
Start: 2022-10-07 | End: 2022-10-08

## 2022-10-07 RX ORDER — AER TRAVELER 0.5 G/1
1 SOLUTION RECTAL; TOPICAL EVERY 4 HOURS
Refills: 0 | Status: DISCONTINUED | OUTPATIENT
Start: 2022-10-07 | End: 2022-10-08

## 2022-10-07 RX ORDER — SIMETHICONE 80 MG/1
80 TABLET, CHEWABLE ORAL EVERY 4 HOURS
Refills: 0 | Status: DISCONTINUED | OUTPATIENT
Start: 2022-10-07 | End: 2022-10-08

## 2022-10-07 RX ORDER — DIBUCAINE 1 %
1 OINTMENT (GRAM) RECTAL EVERY 6 HOURS
Refills: 0 | Status: DISCONTINUED | OUTPATIENT
Start: 2022-10-07 | End: 2022-10-08

## 2022-10-07 RX ORDER — FAMOTIDINE 10 MG/ML
20 INJECTION INTRAVENOUS DAILY
Refills: 0 | Status: DISCONTINUED | OUTPATIENT
Start: 2022-10-07 | End: 2022-10-07

## 2022-10-07 RX ORDER — FAMOTIDINE 10 MG/ML
20 INJECTION INTRAVENOUS ONCE
Refills: 0 | Status: DISCONTINUED | OUTPATIENT
Start: 2022-10-07 | End: 2022-10-07

## 2022-10-07 RX ORDER — LANOLIN
1 OINTMENT (GRAM) TOPICAL EVERY 6 HOURS
Refills: 0 | Status: DISCONTINUED | OUTPATIENT
Start: 2022-10-07 | End: 2022-10-08

## 2022-10-07 RX ORDER — ACETAMINOPHEN 500 MG
975 TABLET ORAL
Refills: 0 | Status: DISCONTINUED | OUTPATIENT
Start: 2022-10-07 | End: 2022-10-08

## 2022-10-07 RX ORDER — OXYCODONE HYDROCHLORIDE 5 MG/1
5 TABLET ORAL ONCE
Refills: 0 | Status: DISCONTINUED | OUTPATIENT
Start: 2022-10-07 | End: 2022-10-08

## 2022-10-07 RX ORDER — OXYTOCIN 10 UNIT/ML
2 VIAL (ML) INJECTION
Qty: 30 | Refills: 0 | Status: DISCONTINUED | OUTPATIENT
Start: 2022-10-07 | End: 2022-10-07

## 2022-10-07 RX ORDER — OXYCODONE HYDROCHLORIDE 5 MG/1
5 TABLET ORAL
Refills: 0 | Status: DISCONTINUED | OUTPATIENT
Start: 2022-10-07 | End: 2022-10-08

## 2022-10-07 RX ADMIN — Medication 15 MILLILITER(S): at 00:17

## 2022-10-07 RX ADMIN — Medication 975 MILLIGRAM(S): at 22:11

## 2022-10-07 RX ADMIN — Medication 30 MILLIGRAM(S): at 05:25

## 2022-10-07 RX ADMIN — Medication 975 MILLIGRAM(S): at 14:57

## 2022-10-07 RX ADMIN — SODIUM CHLORIDE 3 MILLILITER(S): 9 INJECTION INTRAMUSCULAR; INTRAVENOUS; SUBCUTANEOUS at 15:00

## 2022-10-07 RX ADMIN — SIMETHICONE 80 MILLIGRAM(S): 80 TABLET, CHEWABLE ORAL at 12:19

## 2022-10-07 RX ADMIN — SIMETHICONE 80 MILLIGRAM(S): 80 TABLET, CHEWABLE ORAL at 17:51

## 2022-10-07 RX ADMIN — Medication 600 MILLIGRAM(S): at 18:18

## 2022-10-07 RX ADMIN — Medication 2 MILLIUNIT(S)/MIN: at 02:20

## 2022-10-07 RX ADMIN — Medication 975 MILLIGRAM(S): at 22:41

## 2022-10-07 RX ADMIN — Medication 1 SPRAY(S): at 09:24

## 2022-10-07 RX ADMIN — Medication 1 TABLET(S): at 12:13

## 2022-10-07 RX ADMIN — Medication 975 MILLIGRAM(S): at 15:27

## 2022-10-07 RX ADMIN — Medication 600 MILLIGRAM(S): at 17:48

## 2022-10-07 NOTE — OB RN DELIVERY SUMMARY - NSSELHIDDEN_OBGYN_ALL_OB_FT
[NS_DeliveryAssist1_OBGYN_ALL_OB_FT:[id number]],[NS_DeliveryRN_OBGYN_ALL_OB_FT:[id number]],[NS_DeliveryAttending2_OBGYN_ALL_OB_FT:[id number]=],[NS_CirculateRN2_OBGYN_ALL_OB_FT:[id number]] [NS_DeliveryAssist1_OBGYN_ALL_OB_FT:[id number]],[NS_DeliveryRN_OBGYN_ALL_OB_FT:[id number]],[NS_DeliveryAttending2_OBGYN_ALL_OB_FT:[id number]=],[NS_CirculateRN2_OBGYN_ALL_OB_FT:[id number]=],[NS_DeliveryAttending1_OBGYN_ALL_OB_FT:[id number]=]

## 2022-10-07 NOTE — OB RN DELIVERY SUMMARY - NS_MECONIUTRACHA_OBGYN_ALL_OB
----- Message from Syed Hardwick DO sent at 9/3/2021  8:16 AM CDT -----  Please let patient know that COVID 19 test was negative.   Patient was recommended to self quarantine at home but can discontinue quarantine now as long as no fevers for 24 hours (without fever reducing medications) and symptoms are improved.  Follow up with PCP or return to urgent care or ER if symptoms worsen.          None

## 2022-10-07 NOTE — OB PROVIDER DELIVERY SUMMARY - NSPROVIDERDELIVERYNOTE_OBGYN_ALL_OB_FT
Pt became fully dilated and pushed well over intact perineum, delivery of head in BRENT, nose and mouth bulb suctioned, followed by delivery of shoulders and body without difficulty, live female infant, APGARs 9/9, 3090gms, 3-vessel cord + placenta complete, no complications, CBR+ collected

## 2022-10-07 NOTE — OB PROVIDER DELIVERY SUMMARY - NSSELHIDDEN_OBGYN_ALL_OB_FT
[NS_DeliveryAssist1_OBGYN_ALL_OB_FT:[id number]],[NS_DeliveryRN_OBGYN_ALL_OB_FT:[id number]],[NS_DeliveryAttending2_OBGYN_ALL_OB_FT:[id number]=],[NS_CirculateRN2_OBGYN_ALL_OB_FT:[id number]=],[NS_DeliveryAttending1_OBGYN_ALL_OB_FT:[id number]=]

## 2022-10-08 ENCOUNTER — TRANSCRIPTION ENCOUNTER (OUTPATIENT)
Age: 37
End: 2022-10-08

## 2022-10-08 VITALS
RESPIRATION RATE: 18 BRPM | HEART RATE: 60 BPM | SYSTOLIC BLOOD PRESSURE: 115 MMHG | TEMPERATURE: 96 F | DIASTOLIC BLOOD PRESSURE: 68 MMHG

## 2022-10-08 RX ORDER — FAMOTIDINE 10 MG/ML
20 INJECTION INTRAVENOUS DAILY
Refills: 0 | Status: DISCONTINUED | OUTPATIENT
Start: 2022-10-08 | End: 2022-10-08

## 2022-10-08 RX ADMIN — Medication 600 MILLIGRAM(S): at 07:24

## 2022-10-08 RX ADMIN — Medication 975 MILLIGRAM(S): at 09:23

## 2022-10-08 RX ADMIN — FAMOTIDINE 20 MILLIGRAM(S): 10 INJECTION INTRAVENOUS at 09:19

## 2022-10-08 RX ADMIN — Medication 600 MILLIGRAM(S): at 01:11

## 2022-10-08 RX ADMIN — SODIUM CHLORIDE 3 MILLILITER(S): 9 INJECTION INTRAMUSCULAR; INTRAVENOUS; SUBCUTANEOUS at 01:00

## 2022-10-08 RX ADMIN — Medication 1 TABLET(S): at 09:26

## 2022-10-08 RX ADMIN — Medication 600 MILLIGRAM(S): at 12:34

## 2022-10-08 RX ADMIN — Medication 600 MILLIGRAM(S): at 06:54

## 2022-10-08 RX ADMIN — SODIUM CHLORIDE 3 MILLILITER(S): 9 INJECTION INTRAMUSCULAR; INTRAVENOUS; SUBCUTANEOUS at 09:26

## 2022-10-08 NOTE — DISCHARGE NOTE OB - CARE PROVIDER_API CALL
Bairon Sams)  Obstetrics and Gynecology  [address] [place] [address]  Phone: [phone]  Fax: [phone]  Follow Up Time:

## 2022-10-08 NOTE — DISCHARGE NOTE OB - HAS THE PATIENT RECEIVED THE INFLUENZA VACCINE THIS SEASON?
32 Y/O M PMH IV drug user c/o low back pain. Pt states he stopped using heroin one month ago, states he had been using for 5-6 months currently on vitriol. Pt is neurologically intact and well appearing. Likely MSK strain but pt denies specific injury or trauma. Due to H/O IV Drug use will consider CDU placement for MRI to R/O osteo or abscess. no...

## 2022-10-08 NOTE — DISCHARGE NOTE OB - PATIENT PORTAL LINK FT
You can access the FollowMyHealth Patient Portal offered by [facility] by registering at the following website: [email or web address]. By joining [employer]’s FollowMyHealth portal, you will also be able to view your health information using other applications (apps) compatible with our system.

## 2022-10-08 NOTE — PROGRESS NOTE ADULT - ASSESSMENT
IMP: s/p  - PPD # 1 - doing Well    Plan: dc home (pt request), NSAID's/PNV's, f/u office - 6 wks, BL Salpingectomy in 5 wks

## 2022-10-08 NOTE — DISCHARGE NOTE OB - CARE PLAN
1 Principal Discharge DX:	Vaginal delivery  Assessment and plan of treatment:	No heavy lifting. Nothing in the vagina for 6 weeks:  No tampons, douching, sexual intercourse, tub baths or pools. May shower.  If fever 100.4F or greater, heavy vaginal bleeding, or severe abdominal pain, call your Ob/Gyn or come to ED or call 911.  Please see your doctor in 6 weeks for your regular PP visit.

## 2022-10-08 NOTE — DISCHARGE NOTE OB - NS MD DC FALL RISK RISK
For information on Fall & Injury Prevention, visit: [email or web address] OR  [email or web address] OR  https://www.cdc.gov/steadi/patient.html

## 2022-10-08 NOTE — DISCHARGE NOTE OB - PLAN OF CARE
No heavy lifting. Nothing in the vagina for 6 weeks:  No tampons, douching, sexual intercourse, tub baths or pools. May shower.  If fever 100.4F or greater, heavy vaginal bleeding, or severe abdominal pain, call your Ob/Gyn or come to ED or call 911.  Please see your doctor in 6 weeks for your regular PP visit.

## 2022-10-10 ENCOUNTER — APPOINTMENT (OUTPATIENT)
Dept: ANTEPARTUM | Facility: CLINIC | Age: 37
End: 2022-10-10

## 2022-10-13 DIAGNOSIS — J45.20 MILD INTERMITTENT ASTHMA, UNCOMPLICATED: ICD-10-CM

## 2022-10-13 DIAGNOSIS — Z3A.40 40 WEEKS GESTATION OF PREGNANCY: ICD-10-CM

## 2022-10-13 DIAGNOSIS — Z98.82 BREAST IMPLANT STATUS: ICD-10-CM

## 2022-10-17 ENCOUNTER — APPOINTMENT (OUTPATIENT)
Dept: ANTEPARTUM | Facility: CLINIC | Age: 37
End: 2022-10-17

## 2022-10-24 ENCOUNTER — APPOINTMENT (OUTPATIENT)
Dept: ANTEPARTUM | Facility: CLINIC | Age: 37
End: 2022-10-24

## 2022-12-06 NOTE — OB PROVIDER DELIVERY SUMMARY - NSEPISIOTOMY_OBGYN_ALL_OB
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

## 2024-03-14 NOTE — OB RN PATIENT PROFILE - NS_ZIKATRAVEL_OBGYN_ALL_OB
[facility] - Inpatient Rehabilitation Department   Phone: [phone]    Physical Therapy    [] Initial Evaluation            [x] Daily Treatment Note         [] Discharge Summary      Patient: Susan Kenney   : 1937   MRN: [id number]   Date of Service:  3/14/2024  Admitting Diagnosis: Closed fracture of neck of left femur (HCC)  Current Admission Summary: Susan Kenney is a 86 y.o. female who presents for evaluation after mechanical fall. Patient states that she only walks with a walker and was trying to get around the kitchen without it when she lost her balance and fell down landing on her bottom. She is complaining of some low back pain but states that she has had this for several days. She also reports pain shooting down her right leg that she has also had for several days. She is scheduled to see her PCP for this next week. She denies hitting her head or any loss of consciousness. She is not anticoagulated. She denies numbness tingling or weakness distally. No saddle anesthesia, bladder retention or bowel incontinence. No other injuries or complaints at this time.     *Pt is s/p L hip percutaneous pinning on 3/8/24.  Past Medical History:  has a past medical history of Arthritis, Easy bruising, Hyperlipidemia, Hypertension, and Nausea & vomiting.  Past Surgical History:  has a past surgical history that includes Tonsillectomy; Hysterectomy; joint replacement (); joint replacement (1/10); Bunionectomy; Toe Surgery; laminectomy (12/16/10); and hip surgery (Left, 3/8/2024).    Discharge Recommendations: Susan Kenney scored a 16/ on the AM-PAC short mobility form. Current research shows that an AM-PAC score of 17 or less is typically not associated with a discharge to the patient's home setting. Based on the patient's AM-PAC score and their current functional mobility deficits, it is recommended that the patient have 3-5 sessions per week of Physical Therapy at d/c to increase the  date.  Comments: Pt in chair at beginning of treatment and remained in chair at end of session.   Transfers:  Sit to stand transfer: minimal assistance  Stand to sit transfer: contact guard assistance  Comments: VCs for hand placement. Pt transferred from chair and EOB.  Ambulation:  Surface:level surface  Assistive Device: rolling walker  Assistance: minimal assistance  Distance: 15 ft x 2  Gait Mechanics: antalgic limp L LE, forward flexion, increased weight through B UE, significant decreased trung, limited B step height and length  Comments: Pt amb around bed and needed a ling seated rest break due to fatigue. Pt then amb back to recliner chair.   Stair Mobility:  Stair mobility not completed on this date.  Comments:  Wheelchair Mobility:  No w/c mobility completed on this date.  Comments:  Balance:  Static Sitting Balance: fair (+): maintains balance at SBA/supervision without use of UE support  Dynamic Sitting Balance: fair (+): maintains balance at SBA/supervision without use of UE support  Static Standing Balance: poor (+): requires min (A) to maintain balance  Dynamic Standing Balance: poor (+): requires min (A) to maintain balance  Comments: RW in standing and walking.     Other Therapeutic Interventions  Pt assisted to doff socks and hank shoes. Pt performed seated there ex including AP, GS, QS x 10 each bilat. Pt positioned in chair for postural support and comfort with needs placed in reach.      Functional Outcomes  AM-PAC Inpatient Mobility Raw Score : 16              Cognition  WFL  Orientation:    alert and oriented x 4  Command Following:   WFL    Education  Barriers To Learning: hearing  Patient Education: patient educated on goals, PT role and benefits, plan of care, weight-bearing education, general safety, functional mobility training, proper use of assistive device/equipment, disease specific education, transfer training  Learning Assessment:  patient verbalizes understanding, would benefit  No

## 2024-10-23 NOTE — PROGRESS NOTE ADULT - SUBJECTIVE AND OBJECTIVE BOX
[facility]       Radiation Oncology          [address] [place] [address]        O: [phone]        F: [phone]       [email or web address]             RADIATION ONCOLOGY WEEKLY PROGRESS NOTE  Patient ID:   Dylan Dolan  : 1965   MRN: [id number]    Location:  [facility] Radiation Oncology,   [address] [place] [address]   [phone]    DIAGNOSIS:  Cancer Staging   Larynx carcinoma (HCC)  Staging form: Larynx - Glottis, AJCC 8th Edition  - Clinical stage from 2024: Stage MIKE (cT3, cN2, cM0) - Signed by Pasha Starks MD on 2024      TREATMENT DETAILS:  Treatment Site: Larynx LNs  Actual Dose: 6000cGy  Total Planned Dose: 7000cGy  Treatment Technique: IMRT  Fraction Technique: Daily  Therapy imaging monitoring: CBCT daily  Concurrent Systemic Therapy: weekly cisplatin    SUBJECTIVE:   Patient seen for their weekly on treatment evaluation today.  Patient reports dysphagia, weight relatively stable.  Patient reports fatigue.  Patient reports breathing is at baseline, has a tracheostomy in place    OBJECTIVE:     ECO Symptomatic but completely ambulatory  Wt Readings from Last 5 Encounters:   10/23/24 98.5 kg (217 lb 3.2 oz)   10/22/24 98 kg (216 lb)   10/16/24 100.7 kg (221 lb 14.4 oz)   10/16/24 100.7 kg (221 lb 14.4 oz)   10/09/24 100.8 kg (222 lb 4.8 oz)     GENERAL:  General appearance is that of a well-nourished, well-developed in no apparent distress.  HEENT: Mucositis is noted in the cavity but no ulceration, grade 1 radiation dermatitis in bilateral neck, tracheostomy is in place  HEART:  Normal rate and regular rhythm  LUNGS:  Pulmonary effort normal.  ABDOMEN:  Soft, nontender, non distended  EXTREMITIES:  No edema.  No calf tenderness.  MSK:  No spinal tenderness. Normal ROM.  NEUROLOGICAL: Alert and oriented. Strength and sensation intact bilaterally. No focal deficits.   PSYCH: Mood normal, behavior  Pt seen at bedside, no complaints, nursing    Vital Signs Last 24 Hrs  T(C): 35.8 (08 Oct 2022 08:29), Max: 36.6 (07 Oct 2022 15:42)  T(F): 96.4 (08 Oct 2022 08:29), Max: 97.8 (07 Oct 2022 15:42)  HR: 60 (08 Oct 2022 08:29) (60 - 73)  BP: 115/68 (08 Oct 2022 08:29) (90/52 - 116/64)  RR: 18 (08 Oct 2022 08:29) (18 - 18)        Resp - CTA b/l  CVS - S1S2+, RRR  Breasts - soft, NT  Abd - soft, NTND, BS+, uterus - firm  VE - Minimal lochia, perineum- intact  Ext - No Homans                            11.4   9.33  )-----------( 135      ( 07 Oct 2022 16:34 )             34.0                         11.8   10.13 )-----------( 154      ( 07 Oct 2022 00:20 )             34.1       A POS, Rubella - immune, Rubeola - immune, RPR - NR

## 2024-10-28 ENCOUNTER — APPOINTMENT (OUTPATIENT)
Dept: OBGYN | Facility: CLINIC | Age: 39
End: 2024-10-28
Payer: COMMERCIAL

## 2024-10-28 VITALS
SYSTOLIC BLOOD PRESSURE: 116 MMHG | WEIGHT: 94 LBS | HEIGHT: 58 IN | BODY MASS INDEX: 19.73 KG/M2 | DIASTOLIC BLOOD PRESSURE: 75 MMHG

## 2024-10-28 DIAGNOSIS — K64.9 UNSPECIFIED HEMORRHOIDS: ICD-10-CM

## 2024-10-28 DIAGNOSIS — N76.2 ACUTE VULVITIS: ICD-10-CM

## 2024-10-28 DIAGNOSIS — Z87.09 PERSONAL HISTORY OF OTHER DISEASES OF THE RESPIRATORY SYSTEM: ICD-10-CM

## 2024-10-28 DIAGNOSIS — Z01.411 ENCOUNTER FOR GYNECOLOGICAL EXAMINATION (GENERAL) (ROUTINE) WITH ABNORMAL FINDINGS: ICD-10-CM

## 2024-10-28 DIAGNOSIS — N92.6 IRREGULAR MENSTRUATION, UNSPECIFIED: ICD-10-CM

## 2024-10-28 PROCEDURE — 99213 OFFICE O/P EST LOW 20 MIN: CPT | Mod: 25

## 2024-10-28 PROCEDURE — 99459 PELVIC EXAMINATION: CPT

## 2024-10-28 PROCEDURE — 99385 PREV VISIT NEW AGE 18-39: CPT

## 2024-10-28 RX ORDER — CLOTRIMAZOLE AND BETAMETHASONE DIPROPIONATE 10; .5 MG/G; MG/G
1-0.05 CREAM TOPICAL TWICE DAILY
Qty: 1 | Refills: 0 | Status: ACTIVE | COMMUNITY
Start: 2024-10-28 | End: 1900-01-01

## 2024-10-31 DIAGNOSIS — N76.0 ACUTE VAGINITIS: ICD-10-CM

## 2024-10-31 DIAGNOSIS — B96.89 ACUTE VAGINITIS: ICD-10-CM

## 2024-10-31 RX ORDER — METRONIDAZOLE 500 MG/1
500 TABLET ORAL TWICE DAILY
Qty: 14 | Refills: 0 | Status: ACTIVE | COMMUNITY
Start: 2024-10-31 | End: 1900-01-01

## 2024-11-21 ENCOUNTER — APPOINTMENT (OUTPATIENT)
Dept: OBGYN | Facility: CLINIC | Age: 39
End: 2024-11-21
Payer: COMMERCIAL

## 2024-11-21 VITALS
WEIGHT: 94 LBS | SYSTOLIC BLOOD PRESSURE: 91 MMHG | HEIGHT: 58 IN | BODY MASS INDEX: 19.73 KG/M2 | DIASTOLIC BLOOD PRESSURE: 67 MMHG

## 2024-11-21 DIAGNOSIS — N76.0 ACUTE VAGINITIS: ICD-10-CM

## 2024-11-21 DIAGNOSIS — B96.89 ACUTE VAGINITIS: ICD-10-CM

## 2024-11-21 DIAGNOSIS — N89.8 OTHER SPECIFIED NONINFLAMMATORY DISORDERS OF VAGINA: ICD-10-CM

## 2024-11-21 DIAGNOSIS — N90.89 OTHER SPECIFIED NONINFLAMMATORY DISORDERS OF VULVA AND PERINEUM: ICD-10-CM

## 2024-11-21 PROCEDURE — 99214 OFFICE O/P EST MOD 30 MIN: CPT | Mod: 25

## 2024-11-21 PROCEDURE — 99459 PELVIC EXAMINATION: CPT

## 2024-12-19 ENCOUNTER — EMERGENCY (EMERGENCY)
Facility: HOSPITAL | Age: 39
LOS: 0 days | Discharge: HOME | End: 2024-12-19
Attending: EMERGENCY MEDICINE
Payer: COMMERCIAL

## 2024-12-19 VITALS
RESPIRATION RATE: 20 BRPM | WEIGHT: 95.02 LBS | OXYGEN SATURATION: 99 % | SYSTOLIC BLOOD PRESSURE: 113 MMHG | DIASTOLIC BLOOD PRESSURE: 73 MMHG | TEMPERATURE: 98 F | HEIGHT: 59 IN | HEART RATE: 78 BPM

## 2024-12-19 DIAGNOSIS — K21.9 GASTRO-ESOPHAGEAL REFLUX DISEASE WITHOUT ESOPHAGITIS: ICD-10-CM

## 2024-12-19 DIAGNOSIS — K27.9 PEPTIC ULCER, SITE UNSPECIFIED, UNSPECIFIED AS ACUTE OR CHRONIC, WITHOUT HEMORRHAGE OR PERFORATION: ICD-10-CM

## 2024-12-19 DIAGNOSIS — Z98.890 OTHER SPECIFIED POSTPROCEDURAL STATES: Chronic | ICD-10-CM

## 2024-12-19 DIAGNOSIS — K80.20 CALCULUS OF GALLBLADDER WITHOUT CHOLECYSTITIS WITHOUT OBSTRUCTION: ICD-10-CM

## 2024-12-19 DIAGNOSIS — Z98.82 BREAST IMPLANT STATUS: Chronic | ICD-10-CM

## 2024-12-19 DIAGNOSIS — R11.2 NAUSEA WITH VOMITING, UNSPECIFIED: ICD-10-CM

## 2024-12-19 DIAGNOSIS — J45.909 UNSPECIFIED ASTHMA, UNCOMPLICATED: ICD-10-CM

## 2024-12-19 LAB
ALBUMIN SERPL ELPH-MCNC: 4 G/DL — SIGNIFICANT CHANGE UP (ref 3.5–5.2)
ALP SERPL-CCNC: 57 U/L — SIGNIFICANT CHANGE UP (ref 30–115)
ALT FLD-CCNC: 15 U/L — SIGNIFICANT CHANGE UP (ref 0–41)
ANION GAP SERPL CALC-SCNC: 10 MMOL/L — SIGNIFICANT CHANGE UP (ref 7–14)
APPEARANCE UR: CLEAR — SIGNIFICANT CHANGE UP
AST SERPL-CCNC: 17 U/L — SIGNIFICANT CHANGE UP (ref 0–41)
BASOPHILS # BLD AUTO: 0.02 K/UL — SIGNIFICANT CHANGE UP (ref 0–0.2)
BASOPHILS NFR BLD AUTO: 0.2 % — SIGNIFICANT CHANGE UP (ref 0–1)
BILIRUB SERPL-MCNC: 0.2 MG/DL — SIGNIFICANT CHANGE UP (ref 0.2–1.2)
BILIRUB UR-MCNC: NEGATIVE — SIGNIFICANT CHANGE UP
BUN SERPL-MCNC: 12 MG/DL — SIGNIFICANT CHANGE UP (ref 10–20)
CALCIUM SERPL-MCNC: 8.3 MG/DL — LOW (ref 8.4–10.5)
CHLORIDE SERPL-SCNC: 104 MMOL/L — SIGNIFICANT CHANGE UP (ref 98–110)
CO2 SERPL-SCNC: 23 MMOL/L — SIGNIFICANT CHANGE UP (ref 17–32)
COLOR SPEC: YELLOW — SIGNIFICANT CHANGE UP
CREAT SERPL-MCNC: 0.7 MG/DL — SIGNIFICANT CHANGE UP (ref 0.7–1.5)
DIFF PNL FLD: NEGATIVE — SIGNIFICANT CHANGE UP
EGFR: 113 ML/MIN/1.73M2 — SIGNIFICANT CHANGE UP
EOSINOPHIL # BLD AUTO: 0.02 K/UL — SIGNIFICANT CHANGE UP (ref 0–0.7)
EOSINOPHIL NFR BLD AUTO: 0.2 % — SIGNIFICANT CHANGE UP (ref 0–8)
GLUCOSE SERPL-MCNC: 103 MG/DL — HIGH (ref 70–99)
GLUCOSE UR QL: NEGATIVE MG/DL — SIGNIFICANT CHANGE UP
HCT VFR BLD CALC: 38.3 % — SIGNIFICANT CHANGE UP (ref 37–47)
HCV AB S/CO SERPL IA: 0.04 COI — SIGNIFICANT CHANGE UP
HCV AB SERPL-IMP: SIGNIFICANT CHANGE UP
HGB BLD-MCNC: 13.3 G/DL — SIGNIFICANT CHANGE UP (ref 12–16)
HIV 1 & 2 AB SERPL IA.RAPID: SIGNIFICANT CHANGE UP
IMM GRANULOCYTES NFR BLD AUTO: 0.4 % — HIGH (ref 0.1–0.3)
KETONES UR-MCNC: NEGATIVE MG/DL — SIGNIFICANT CHANGE UP
LEUKOCYTE ESTERASE UR-ACNC: NEGATIVE — SIGNIFICANT CHANGE UP
LIDOCAIN IGE QN: 36 U/L — SIGNIFICANT CHANGE UP (ref 7–60)
LYMPHOCYTES # BLD AUTO: 1.06 K/UL — LOW (ref 1.2–3.4)
LYMPHOCYTES # BLD AUTO: 12.6 % — LOW (ref 20.5–51.1)
MCHC RBC-ENTMCNC: 32.3 PG — HIGH (ref 27–31)
MCHC RBC-ENTMCNC: 34.7 G/DL — SIGNIFICANT CHANGE UP (ref 32–37)
MCV RBC AUTO: 93 FL — SIGNIFICANT CHANGE UP (ref 81–99)
MONOCYTES # BLD AUTO: 0.64 K/UL — HIGH (ref 0.1–0.6)
MONOCYTES NFR BLD AUTO: 7.6 % — SIGNIFICANT CHANGE UP (ref 1.7–9.3)
NEUTROPHILS # BLD AUTO: 6.66 K/UL — HIGH (ref 1.4–6.5)
NEUTROPHILS NFR BLD AUTO: 79 % — HIGH (ref 42.2–75.2)
NITRITE UR-MCNC: NEGATIVE — SIGNIFICANT CHANGE UP
NRBC # BLD: 0 /100 WBCS — SIGNIFICANT CHANGE UP (ref 0–0)
PH UR: 6.5 — SIGNIFICANT CHANGE UP (ref 5–8)
PLATELET # BLD AUTO: 184 K/UL — SIGNIFICANT CHANGE UP (ref 130–400)
PMV BLD: 10.9 FL — HIGH (ref 7.4–10.4)
POTASSIUM SERPL-MCNC: 3.9 MMOL/L — SIGNIFICANT CHANGE UP (ref 3.5–5)
POTASSIUM SERPL-SCNC: 3.9 MMOL/L — SIGNIFICANT CHANGE UP (ref 3.5–5)
PROT SERPL-MCNC: 6.3 G/DL — SIGNIFICANT CHANGE UP (ref 6–8)
PROT UR-MCNC: NEGATIVE MG/DL — SIGNIFICANT CHANGE UP
RBC # BLD: 4.12 M/UL — LOW (ref 4.2–5.4)
RBC # FLD: 12.3 % — SIGNIFICANT CHANGE UP (ref 11.5–14.5)
SODIUM SERPL-SCNC: 137 MMOL/L — SIGNIFICANT CHANGE UP (ref 135–146)
SP GR SPEC: 1.01 — SIGNIFICANT CHANGE UP (ref 1–1.03)
UROBILINOGEN FLD QL: 0.2 MG/DL — SIGNIFICANT CHANGE UP (ref 0.2–1)
WBC # BLD: 8.43 K/UL — SIGNIFICANT CHANGE UP (ref 4.8–10.8)
WBC # FLD AUTO: 8.43 K/UL — SIGNIFICANT CHANGE UP (ref 4.8–10.8)

## 2024-12-19 PROCEDURE — 36415 COLL VENOUS BLD VENIPUNCTURE: CPT

## 2024-12-19 PROCEDURE — 81025 URINE PREGNANCY TEST: CPT

## 2024-12-19 PROCEDURE — 86803 HEPATITIS C AB TEST: CPT

## 2024-12-19 PROCEDURE — 96375 TX/PRO/DX INJ NEW DRUG ADDON: CPT

## 2024-12-19 PROCEDURE — 85025 COMPLETE CBC W/AUTO DIFF WBC: CPT

## 2024-12-19 PROCEDURE — 99284 EMERGENCY DEPT VISIT MOD MDM: CPT

## 2024-12-19 PROCEDURE — 76705 ECHO EXAM OF ABDOMEN: CPT

## 2024-12-19 PROCEDURE — 96374 THER/PROPH/DIAG INJ IV PUSH: CPT

## 2024-12-19 PROCEDURE — 83690 ASSAY OF LIPASE: CPT

## 2024-12-19 PROCEDURE — 86703 HIV-1/HIV-2 1 RESULT ANTBDY: CPT

## 2024-12-19 PROCEDURE — 76705 ECHO EXAM OF ABDOMEN: CPT | Mod: 26

## 2024-12-19 PROCEDURE — 80053 COMPREHEN METABOLIC PANEL: CPT

## 2024-12-19 PROCEDURE — 81003 URINALYSIS AUTO W/O SCOPE: CPT

## 2024-12-19 PROCEDURE — 99284 EMERGENCY DEPT VISIT MOD MDM: CPT | Mod: 25

## 2024-12-19 RX ORDER — SUCRALFATE 1 G/1
10 TABLET ORAL
Qty: 140 | Refills: 0
Start: 2024-12-19 | End: 2024-12-25

## 2024-12-19 RX ORDER — KETOROLAC TROMETHAMINE 30 MG/ML
15 INJECTION INTRAMUSCULAR; INTRAVENOUS ONCE
Refills: 0 | Status: DISCONTINUED | OUTPATIENT
Start: 2024-12-19 | End: 2024-12-19

## 2024-12-19 RX ORDER — ONDANSETRON HYDROCHLORIDE 4 MG/1
1 TABLET, FILM COATED ORAL
Qty: 1 | Refills: 0
Start: 2024-12-19 | End: 2024-12-23

## 2024-12-19 RX ORDER — 0.9 % SODIUM CHLORIDE 0.9 %
1000 INTRAVENOUS SOLUTION INTRAVENOUS ONCE
Refills: 0 | Status: COMPLETED | OUTPATIENT
Start: 2024-12-19 | End: 2024-12-19

## 2024-12-19 RX ORDER — ONDANSETRON HYDROCHLORIDE 4 MG/1
4 TABLET, FILM COATED ORAL ONCE
Refills: 0 | Status: COMPLETED | OUTPATIENT
Start: 2024-12-19 | End: 2024-12-19

## 2024-12-19 RX ORDER — METOCLOPRAMIDE HYDROCHLORIDE 10 MG/1
10 TABLET ORAL ONCE
Refills: 0 | Status: COMPLETED | OUTPATIENT
Start: 2024-12-19 | End: 2024-12-19

## 2024-12-19 RX ORDER — FAMOTIDINE 20 MG/1
20 TABLET, FILM COATED ORAL ONCE
Refills: 0 | Status: COMPLETED | OUTPATIENT
Start: 2024-12-19 | End: 2024-12-19

## 2024-12-19 RX ADMIN — FAMOTIDINE 20 MILLIGRAM(S): 20 TABLET, FILM COATED ORAL at 11:43

## 2024-12-19 RX ADMIN — Medication 1000 MILLILITER(S): at 11:42

## 2024-12-19 RX ADMIN — ONDANSETRON HYDROCHLORIDE 4 MILLIGRAM(S): 4 TABLET, FILM COATED ORAL at 11:42

## 2024-12-19 RX ADMIN — METOCLOPRAMIDE HYDROCHLORIDE 10 MILLIGRAM(S): 10 TABLET ORAL at 15:18

## 2024-12-19 RX ADMIN — KETOROLAC TROMETHAMINE 15 MILLIGRAM(S): 30 INJECTION INTRAMUSCULAR; INTRAVENOUS at 15:18

## 2024-12-19 NOTE — ED PROVIDER NOTE - PHYSICAL EXAMINATION
VITAL SIGNS: I have reviewed nursing notes and confirm.  CONSTITUTIONAL: well-appearing, non-toxic, NAD  SKIN: Warm dry, normal skin turgor, no acute rash, no bruising  HEAD: NCAT  EYES: EOMI, PERRLA, no scleral icterus, normal conjunctiva  ENT: Moist mucous membranes, OR clear. Normal pharynx  NECK: Supple; non tender. Full ROM. No cervical LAD  CARD: RRR, no murmurs, rubs or gallops  RESP: clear to ausculation b/l.  No rales, rhonchi, or wheezing. No increased WOB.  ABD: soft, + BS, RUQ tender, non-distended, no rebound or guarding. No CVA tenderness  EXT: Full ROM, no bony tenderness, pulses intact in bilateral UE and LE, no pedal edema, no calf tenderness  NEURO: normal motor. normal sensory. Normal gait.  PSYCH: Cooperative, appropriate.

## 2024-12-19 NOTE — ED PROVIDER NOTE - OBJECTIVE STATEMENT
Patient is a 39y female no pmhx p/w right sided abd pain w/ nausea and non bloody non bilious vomiting x2 days. Otherwise denies any fever, chills, headache, changes in vision, cough, congestion, cp, palpitations, sob, constipation, urinary complaints, lower extremity pain/swelling.

## 2024-12-19 NOTE — ED PROVIDER NOTE - ATTENDING CONTRIBUTION TO CARE
39-year-old female history of asthma mild controlled, now presents with epigastric and right upper quadrant pain x 3 days.  No associated nausea vomiting diaphoresis or chest pain or shortness of breath.  No anorexia.  Ate this morning.  Pain recurred.  Had an egg and croissant.  No dysuria no hematuria no flank pain.  She LMP 1 month ago.    vss, [nontoxic, well appearing], pink conj, anicteric, MMM, neck supple, CTAB, RRR, equal radial pulses bilat, abd soft mild right upper quadrant tenderness no distention no McBurney's no obturator, no cva tend. no calves tend, no edema, no fnd. no rashes.

## 2024-12-19 NOTE — ED PROVIDER NOTE - PROGRESS NOTE DETAILS
Authored by Dr. Mendiola: Pending reads on ultrasound .  Patient reassessed.  Feels better.  Labs reviewed.

## 2024-12-19 NOTE — ED PROVIDER NOTE - CARE PROVIDER_API CALL
Yes Kole Beverly  Gastroenterology  [address] [place] [address]  Phone: [phone]  Fax: [phone]  Follow Up Time: 1-3 Days

## 2024-12-19 NOTE — ED PROVIDER NOTE - PATIENT PORTAL LINK FT
You can access the FollowMyHealth Patient Portal offered by [facility] by registering at the following website: [email or web address]. By joining [employer]’s FollowMyHealth portal, you will also be able to view your health information using other applications (apps) compatible with our system.

## 2024-12-19 NOTE — ED PROVIDER NOTE - CLINICAL SUMMARY MEDICAL DECISION MAKING FREE TEXT BOX
Epigastric/right upper quadrant pain, GERD/PUD/gallstones.  Labs and imaging.  Reassess.  DC with follow-up

## 2025-01-31 ENCOUNTER — EMERGENCY (EMERGENCY)
Facility: HOSPITAL | Age: 40
LOS: 0 days | Discharge: HOME | End: 2025-01-31
Attending: EMERGENCY MEDICINE
Payer: COMMERCIAL

## 2025-01-31 VITALS
RESPIRATION RATE: 20 BRPM | DIASTOLIC BLOOD PRESSURE: 70 MMHG | SYSTOLIC BLOOD PRESSURE: 110 MMHG | HEART RATE: 111 BPM | TEMPERATURE: 100 F | HEIGHT: 59 IN | WEIGHT: 93.92 LBS | OXYGEN SATURATION: 98 %

## 2025-01-31 VITALS — RESPIRATION RATE: 18 BRPM | OXYGEN SATURATION: 98 % | HEART RATE: 92 BPM

## 2025-01-31 DIAGNOSIS — R07.89 OTHER CHEST PAIN: ICD-10-CM

## 2025-01-31 DIAGNOSIS — Z98.82 BREAST IMPLANT STATUS: Chronic | ICD-10-CM

## 2025-01-31 DIAGNOSIS — J45.909 UNSPECIFIED ASTHMA, UNCOMPLICATED: ICD-10-CM

## 2025-01-31 DIAGNOSIS — Z98.890 OTHER SPECIFIED POSTPROCEDURAL STATES: Chronic | ICD-10-CM

## 2025-01-31 DIAGNOSIS — J06.9 ACUTE UPPER RESPIRATORY INFECTION, UNSPECIFIED: ICD-10-CM

## 2025-01-31 DIAGNOSIS — R05.9 COUGH, UNSPECIFIED: ICD-10-CM

## 2025-01-31 DIAGNOSIS — R11.2 NAUSEA WITH VOMITING, UNSPECIFIED: ICD-10-CM

## 2025-01-31 DIAGNOSIS — B97.89 OTHER VIRAL AGENTS AS THE CAUSE OF DISEASES CLASSIFIED ELSEWHERE: ICD-10-CM

## 2025-01-31 LAB
FLUAV AG NPH QL: DETECTED
FLUBV AG NPH QL: SIGNIFICANT CHANGE UP
RSV RNA NPH QL NAA+NON-PROBE: SIGNIFICANT CHANGE UP
SARS-COV-2 RNA SPEC QL NAA+PROBE: SIGNIFICANT CHANGE UP

## 2025-01-31 PROCEDURE — 99283 EMERGENCY DEPT VISIT LOW MDM: CPT | Mod: 25

## 2025-01-31 PROCEDURE — 0241U: CPT

## 2025-01-31 PROCEDURE — 71046 X-RAY EXAM CHEST 2 VIEWS: CPT

## 2025-01-31 PROCEDURE — 71046 X-RAY EXAM CHEST 2 VIEWS: CPT | Mod: 26

## 2025-01-31 PROCEDURE — 99284 EMERGENCY DEPT VISIT MOD MDM: CPT

## 2025-01-31 RX ORDER — ACETAMINOPHEN 80 MG/.8ML
975 SOLUTION/ DROPS ORAL ONCE
Refills: 0 | Status: COMPLETED | OUTPATIENT
Start: 2025-01-31 | End: 2025-01-31

## 2025-01-31 RX ORDER — FLUCONAZOLE 200 MG/1
200 TABLET ORAL ONCE
Refills: 0 | Status: COMPLETED | OUTPATIENT
Start: 2025-01-31 | End: 2025-01-31

## 2025-01-31 RX ORDER — IBUPROFEN 200 MG
600 TABLET ORAL ONCE
Refills: 0 | Status: COMPLETED | OUTPATIENT
Start: 2025-01-31 | End: 2025-01-31

## 2025-01-31 RX ORDER — ONDANSETRON 4 MG/1
8 TABLET ORAL ONCE
Refills: 0 | Status: COMPLETED | OUTPATIENT
Start: 2025-01-31 | End: 2025-01-31

## 2025-01-31 RX ORDER — ONDANSETRON 4 MG/1
1 TABLET ORAL
Qty: 1 | Refills: 0
Start: 2025-01-31 | End: 2025-02-04

## 2025-01-31 RX ADMIN — FLUCONAZOLE 200 MILLIGRAM(S): 200 TABLET ORAL at 11:34

## 2025-01-31 RX ADMIN — ONDANSETRON 8 MILLIGRAM(S): 4 TABLET ORAL at 11:33

## 2025-01-31 RX ADMIN — ACETAMINOPHEN 975 MILLIGRAM(S): 80 SOLUTION/ DROPS ORAL at 11:34

## 2025-01-31 RX ADMIN — Medication 600 MILLIGRAM(S): at 11:34

## 2025-01-31 NOTE — ED PROVIDER NOTE - ATTENDING APP SHARED VISIT CONTRIBUTION OF CARE
Patient is a 39-year-old female with URI symptoms for 1 week.  Today had some vomiting.  No diarrhea.  Dry cough but reports some burning and chest tightness with cough.  No wheezing.    Exam: Lungs clear, moist mucous membranes, cap refill less than 2 seconds, soft nontender abdomen, no acute distress  Plan: Chest x-ray, NSAIDs, antiemetics    Number of diagnoses or management options:  [ ] Referral or consultation made    Complexity of data reviewed:  [ ] Decision made to obtain old record(s) or additional history from the family, caretaker, or other source  [x] Laboratory test(s) ordered and/or reviewed  [ ] Independent interpretation of EKG by Dr. Zana Murphy:  [ ] Independent interpretation of Radiology by Dr. Zana Murphy:   [ ] I, Zana Murphy, had a discussion with    Risk (Management Options):  [ ] High: emergency surgery; monitored drug therapy; controlled parenteral therapy; decision for DNR Patient is a 39-year-old female with URI symptoms for 1 week.  Today had some vomiting.  No diarrhea.  Dry cough but reports some burning and chest tightness with cough.  No wheezing.    Exam: Lungs clear, moist mucous membranes, cap refill less than 2 seconds, soft nontender abdomen, no acute distress  Plan: Chest x-ray, NSAIDs, antiemetics    Number of diagnoses or management options:  [ ] Referral or consultation made    Complexity of data reviewed:  [ ] Decision made to obtain old record(s) or additional history from the family, caretaker, or other source  [x] Laboratory test(s) ordered and/or reviewed  [ ] Independent interpretation of EKG by Dr. Zana Murphy:  [x] Independent interpretation of Radiology by Dr. Zana Murphy: QUAN  [ ] I, Zana Murphy, had a discussion with    Risk (Management Options):  [ ] High: emergency surgery; monitored drug therapy; controlled parenteral therapy; decision for DNR

## 2025-01-31 NOTE — ED PROVIDER NOTE - PHYSICAL EXAMINATION
As Follows:  CONST: Well appearing in NAD  EYES: PERRL, EOMI, Sclera and conjunctiva clear.   CARD: No murmurs, rubs, or gallops; Tachy  RESP: BS Equal B/L, No wheezes, rhonchi or rales. No distress or accessory breathing  GI: Soft, non-tender, non-distended.  SKIN: Warm, dry, no acute rashes. MMM  NEURO: Alert and Oriented, No focal deficits. Strength and sensation intact. Steady gait

## 2025-01-31 NOTE — ED PROVIDER NOTE - CARE PROVIDER_API CALL
Paul Estrada  Internal Medicine  [address] [place] [address]  Phone: [phone]  Fax: [phone]  Established Patient  Follow Up Time: Routine

## 2025-01-31 NOTE — ED PROVIDER NOTE - PATIENT PORTAL LINK FT
You can access the FollowMyHealth Patient Portal offered by [facility] by registering at the following website: [email or web address]. By joining [employer]’s FollowMyHealth portal, you will also be able to view your health information using other applications (apps) compatible with our system.

## 2025-01-31 NOTE — ED PROVIDER NOTE - OBJECTIVE STATEMENT
Patient is a 39 year old female with pmhx of asthma presents for evaluation of cough, weakness, nausea, and episode of vomiting after eating solids. Her kids are all sick with similar symptoms. She denies any chest pain, shortness of breath, abdominal pain, or urinary complaints.

## 2025-05-06 NOTE — OB PROVIDER H&P - NSNYCREQUIREMENTS_OBGYN_ALL_OB
CGM data uploaded to [employer]. 28 days of CGM data reviewed for diagnostic purposes. Sensor is active 98% of the time. Time in range is 69% with 29% hyperglycemia, 2% severe hyperglycemia, 0% hypoglycemia, and 0% severe hypoglycemia. Average sensor glucose is 164mg/dL. GMI 7.2%. Trends noted: Postprandial hyperglycemia after dinner- at times. A couple fasting hypoglycemias noted, verified? Please refer to media tab for detailed reports associated with this encounter.     Recommendations: Continue with current regimen.  Advised to follow a consistent carb diet, try to stick to 30 to 45 g carbs with meals and 15 to 20 g carb snacks. Follow-up as scheduled.   [facility]
